# Patient Record
Sex: FEMALE | Race: WHITE | NOT HISPANIC OR LATINO
[De-identification: names, ages, dates, MRNs, and addresses within clinical notes are randomized per-mention and may not be internally consistent; named-entity substitution may affect disease eponyms.]

---

## 2017-01-17 ENCOUNTER — CHARTING TRANS (OUTPATIENT)
Dept: OTHER | Age: 37
End: 2017-01-17

## 2017-09-29 ENCOUNTER — HOSPITAL ENCOUNTER (OUTPATIENT)
Dept: GENERAL RADIOLOGY | Age: 37
Discharge: HOME | End: 2017-09-29
Attending: FAMILY MEDICINE
Payer: COMMERCIAL

## 2017-09-29 ENCOUNTER — OFFICE VISIT (OUTPATIENT)
Dept: FAMILY MEDICINE CLINIC | Facility: CLINIC | Age: 37
End: 2017-09-29

## 2017-09-29 VITALS
OXYGEN SATURATION: 97 % | TEMPERATURE: 99 F | SYSTOLIC BLOOD PRESSURE: 106 MMHG | RESPIRATION RATE: 14 BRPM | WEIGHT: 149 LBS | DIASTOLIC BLOOD PRESSURE: 66 MMHG | HEART RATE: 66 BPM | BODY MASS INDEX: 23 KG/M2

## 2017-09-29 DIAGNOSIS — M76.32 ILIOTIBIAL BAND SYNDROME OF LEFT SIDE: ICD-10-CM

## 2017-09-29 DIAGNOSIS — M25.562 CHRONIC PAIN OF LEFT KNEE: ICD-10-CM

## 2017-09-29 DIAGNOSIS — G89.29 CHRONIC PAIN OF LEFT KNEE: ICD-10-CM

## 2017-09-29 DIAGNOSIS — G89.29 CHRONIC PAIN OF LEFT KNEE: Primary | ICD-10-CM

## 2017-09-29 DIAGNOSIS — M25.562 CHRONIC PAIN OF LEFT KNEE: Primary | ICD-10-CM

## 2017-09-29 PROCEDURE — 99213 OFFICE O/P EST LOW 20 MIN: CPT | Performed by: FAMILY MEDICINE

## 2017-09-29 PROCEDURE — 73560 X-RAY EXAM OF KNEE 1 OR 2: CPT | Performed by: FAMILY MEDICINE

## 2017-10-01 NOTE — PROGRESS NOTES
Chief Complaint:   Patient presents with:  Knee Pain: on Left, 1st occurance in August, last weekend during 5K gave out, had difficulty even walking    HPI:   This is a 42year old female presenting for worsening pain to left knee along lateral aspect, wor tightness and shortness of breath. Cardiovascular: Negative for chest pain, palpitations and leg swelling. Gastrointestinal: Negative for vomiting, abdominal pain, diarrhea, blood in stool and abdominal distention.    Endocrine: Negative for cold intol normal. Pupils are equal, round, and reactive to light. Right eye exhibits no discharge. Left eye exhibits no discharge. No scleral icterus. Neck: Normal range of motion. Neck supple. No JVD present. No tracheal deviation present. No thyromegaly present. INDICATIONS:  M76.32 Iliotibial band syndrome, left leg G89.29 Other chronic pain M25.562 Pain in left knee  PATIENT STATED HISTORY: (As transcribed by Technologist)  Patient has run in 5 K runs in the past but in August developed lateral knee pain left kn

## 2017-10-27 ENCOUNTER — OFFICE VISIT (OUTPATIENT)
Dept: FAMILY MEDICINE CLINIC | Facility: CLINIC | Age: 37
End: 2017-10-27

## 2017-10-27 ENCOUNTER — LAB ENCOUNTER (OUTPATIENT)
Dept: LAB | Age: 37
End: 2017-10-27
Attending: FAMILY MEDICINE
Payer: COMMERCIAL

## 2017-10-27 VITALS
DIASTOLIC BLOOD PRESSURE: 70 MMHG | WEIGHT: 150 LBS | HEIGHT: 67 IN | RESPIRATION RATE: 16 BRPM | HEART RATE: 70 BPM | OXYGEN SATURATION: 98 % | BODY MASS INDEX: 23.54 KG/M2 | SYSTOLIC BLOOD PRESSURE: 100 MMHG

## 2017-10-27 DIAGNOSIS — Z13.0 SCREENING FOR ENDOCRINE, NUTRITIONAL, METABOLIC AND IMMUNITY DISORDER: ICD-10-CM

## 2017-10-27 DIAGNOSIS — Z13.228 SCREENING FOR ENDOCRINE, NUTRITIONAL, METABOLIC AND IMMUNITY DISORDER: ICD-10-CM

## 2017-10-27 DIAGNOSIS — Z13.21 SCREENING FOR ENDOCRINE, NUTRITIONAL, METABOLIC AND IMMUNITY DISORDER: ICD-10-CM

## 2017-10-27 DIAGNOSIS — Z01.419 WELL FEMALE EXAM WITH ROUTINE GYNECOLOGICAL EXAM: Primary | ICD-10-CM

## 2017-10-27 DIAGNOSIS — Z13.29 SCREENING FOR ENDOCRINE, NUTRITIONAL, METABOLIC AND IMMUNITY DISORDER: ICD-10-CM

## 2017-10-27 DIAGNOSIS — K58.2 IRRITABLE BOWEL SYNDROME WITH BOTH CONSTIPATION AND DIARRHEA: ICD-10-CM

## 2017-10-27 DIAGNOSIS — Z12.4 SCREENING FOR CERVICAL CANCER: ICD-10-CM

## 2017-10-27 PROCEDURE — 80050 GENERAL HEALTH PANEL: CPT | Performed by: FAMILY MEDICINE

## 2017-10-27 PROCEDURE — 36415 COLL VENOUS BLD VENIPUNCTURE: CPT | Performed by: FAMILY MEDICINE

## 2017-10-27 PROCEDURE — 87624 HPV HI-RISK TYP POOLED RSLT: CPT | Performed by: FAMILY MEDICINE

## 2017-10-27 PROCEDURE — 88175 CYTOPATH C/V AUTO FLUID REDO: CPT | Performed by: FAMILY MEDICINE

## 2017-10-27 PROCEDURE — 82306 VITAMIN D 25 HYDROXY: CPT | Performed by: FAMILY MEDICINE

## 2017-10-27 PROCEDURE — 82784 ASSAY IGA/IGD/IGG/IGM EACH: CPT | Performed by: FAMILY MEDICINE

## 2017-10-27 PROCEDURE — 83516 IMMUNOASSAY NONANTIBODY: CPT | Performed by: FAMILY MEDICINE

## 2017-10-27 PROCEDURE — 86255 FLUORESCENT ANTIBODY SCREEN: CPT | Performed by: FAMILY MEDICINE

## 2017-10-27 PROCEDURE — 99395 PREV VISIT EST AGE 18-39: CPT | Performed by: FAMILY MEDICINE

## 2017-10-27 RX ORDER — NORGESTIMATE-ETHINYL ESTRADIOL 7DAYSX3 28
1 TABLET ORAL DAILY
Qty: 84 TABLET | Refills: 3 | Status: SHIPPED | OUTPATIENT
Start: 2017-10-27 | End: 2018-11-22

## 2017-10-27 NOTE — PROGRESS NOTES
Judith Prasad is a 42year old female.     CC:  Patient presents with:  Physical: annual physical      HPI:  Annual Physical due on 09/20/1980  Annual Depression Screen due on 03/18/2017  Influenza Vaccine(1) due on 09/01/2017  Pap Smear,3 Years due anemia; denies bruising or excessive bleeding  ENDOCRINE: denies excessive thirst or urination; denies unexpected wt gain or wt loss  ALLERGY/IMM.: denies food or seasonal allergies  PSYCH: no symptoms of depression or anxiety, depression screening negativ Visit:  Signed Prescriptions Disp Refills    TRINESSA, Sd, 0.18/0.215/0.25 MG-35 MCG Oral Tab 84 tablet 3      Sig: Take 1 tablet by mouth daily.              Imaging & Consults:  None    Return in about 1 year (around 10/27/2018) for annual physical.

## 2018-06-22 NOTE — PROGRESS NOTES
[facility] Family Medicine Office Note  Chief Complaint:   No chief complaint on file.       HPI:   This is a 42year old female coming in for  HPI    Decreased libido    Has been on OCP since age 23  Never used any other forms of contracep encounter: 152 lb. Vital signs reviewed. Appears stated age, well groomed. Physical Exam   Constitutional: She is oriented to person, place, and time. She appears well-developed and well-nourished.    HENT:   Mouth/Throat: Oropharynx is clear and moist.

## 2018-06-22 NOTE — PATIENT INSTRUCTIONS
We will call with information for female pelvic floor therapy counselors or other counselors    [address], [address]  [phone]

## 2018-11-26 RX ORDER — NORGESTIMATE-ETHINYL ESTRADIOL 7DAYSX3 28
1 TABLET ORAL DAILY
Qty: 28 TABLET | Refills: 0 | Status: SHIPPED | OUTPATIENT
Start: 2018-11-26 | End: 2018-12-03 | Stop reason: RX

## 2018-12-03 ENCOUNTER — TELEPHONE (OUTPATIENT)
Dept: FAMILY MEDICINE CLINIC | Facility: CLINIC | Age: 38
End: 2018-12-03

## 2018-12-03 RX ORDER — NORGESTIMATE AND ETHINYL ESTRADIOL 7DAYSX3 28
1 KIT ORAL DAILY
Qty: 3 PACKAGE | Refills: 0 | Status: SHIPPED | OUTPATIENT
Start: 2018-12-03 | End: 2018-12-21

## 2018-12-03 NOTE — TELEPHONE ENCOUNTER
Spoke Kayley, pharmacist at [facility], who states that [facility] no longer carries [place]. They carry Tri-Sprintec. (Rx for Trinessa ordered FLOR)       OK for new Rx? Please advise.

## 2018-12-03 NOTE — TELEPHONE ENCOUNTER
Pt states all the local pharmacies are out of [facility], 28, 0.18/0.215/0.25 MG-35 MCG Oral Tab   Pt would like to know if she should change the medication? Pls call pt.

## 2018-12-05 ENCOUNTER — TELEPHONE (OUTPATIENT)
Dept: FAMILY MEDICINE CLINIC | Facility: CLINIC | Age: 38
End: 2018-12-05

## 2018-12-05 NOTE — TELEPHONE ENCOUNTER
I contacted Edith Morrell at [facility] and confirmed that they have the RX on file and will get it ready. A 90 day supply went threw for $0.  I called and left a detailed message on pts cell that they do have the RX and will get it ready now.

## 2018-12-05 NOTE — TELEPHONE ENCOUNTER
Patient checked with her pharmacy and they did not have an order for TRI-SPRINTEC 0.18/0.215/0.25 MG-35 MCG Oral Tab, please resend Hossein.

## 2018-12-21 ENCOUNTER — LAB ENCOUNTER (OUTPATIENT)
Dept: LAB | Age: 38
End: 2018-12-21
Attending: FAMILY MEDICINE
Payer: COMMERCIAL

## 2018-12-21 ENCOUNTER — OFFICE VISIT (OUTPATIENT)
Dept: FAMILY MEDICINE CLINIC | Facility: CLINIC | Age: 38
End: 2018-12-21
Payer: COMMERCIAL

## 2018-12-21 VITALS
BODY MASS INDEX: 23.54 KG/M2 | OXYGEN SATURATION: 98 % | HEIGHT: 67 IN | RESPIRATION RATE: 16 BRPM | HEART RATE: 60 BPM | WEIGHT: 150 LBS | DIASTOLIC BLOOD PRESSURE: 64 MMHG | SYSTOLIC BLOOD PRESSURE: 114 MMHG | TEMPERATURE: 99 F

## 2018-12-21 DIAGNOSIS — Z13.228 SCREENING FOR ENDOCRINE, NUTRITIONAL, METABOLIC AND IMMUNITY DISORDER: ICD-10-CM

## 2018-12-21 DIAGNOSIS — Z00.00 ROUTINE PHYSICAL EXAMINATION: Primary | ICD-10-CM

## 2018-12-21 DIAGNOSIS — F52.9 FEMALE SEXUAL DYSFUNCTION: ICD-10-CM

## 2018-12-21 DIAGNOSIS — Z13.29 SCREENING FOR ENDOCRINE, NUTRITIONAL, METABOLIC AND IMMUNITY DISORDER: ICD-10-CM

## 2018-12-21 DIAGNOSIS — Z13.0 SCREENING FOR ENDOCRINE, NUTRITIONAL, METABOLIC AND IMMUNITY DISORDER: ICD-10-CM

## 2018-12-21 DIAGNOSIS — Z13.21 SCREENING FOR ENDOCRINE, NUTRITIONAL, METABOLIC AND IMMUNITY DISORDER: ICD-10-CM

## 2018-12-21 DIAGNOSIS — Z12.39 SCREENING FOR BREAST CANCER: ICD-10-CM

## 2018-12-21 PROCEDURE — 36415 COLL VENOUS BLD VENIPUNCTURE: CPT | Performed by: FAMILY MEDICINE

## 2018-12-21 PROCEDURE — 80061 LIPID PANEL: CPT | Performed by: FAMILY MEDICINE

## 2018-12-21 PROCEDURE — 99396 PREV VISIT EST AGE 40-64: CPT | Performed by: FAMILY MEDICINE

## 2018-12-21 PROCEDURE — 80050 GENERAL HEALTH PANEL: CPT | Performed by: FAMILY MEDICINE

## 2018-12-21 RX ORDER — NORGESTIMATE AND ETHINYL ESTRADIOL 7DAYSX3 28
1 KIT ORAL DAILY
Qty: 3 PACKAGE | Refills: 3 | Status: SHIPPED | OUTPATIENT
Start: 2018-12-21 | End: 2019-12-17

## 2018-12-21 NOTE — PROGRESS NOTES
Carolina Law is a 34year old female. CC:  Patient presents with:   Well Adult: annual visit, no pap      HPI:  Mammogram due on 09/20/1978  Influenza Vaccine(1) due on 09/01/2018  Annual Depression Screen due on 10/27/2018  Annual Physical due Readings from Last 6 Encounters:  12/21/18 : 150 lb  06/22/18 : 152 lb  10/27/17 : 150 lb  09/29/17 : 149 lb  03/18/16 : 142 lb  03/11/16 : 145 lb      BP Readings from Last 3 Encounters:  12/21/18 : 114/64  06/22/18 : 100/70  10/27/17 : 100/70    REVIEW O suspicious lesions  NEURO: Oriented times three, cranial nerves are intact, motor and sensory are grossly intact  PSYCH: Normal affect and mood             ASSESSMENT/PLAN:      1.  Routine physical examination  Cont OCP     2. Screening for endocrine, nutr

## 2018-12-21 NOTE — PATIENT INSTRUCTIONS
Health Maintenance    Eat healthy well balanced diet - majority should be protein and vegetables  Get at least 150 min of exercise per week (30 min/5 days)  Wear sunscreen - SPF 15 or higher and reapply every 2 hours as needed.   Wear seat belts and drive s Acute cholecystitis with chronic cholecystitis Acute cholecystitis with chronic cholecystitis    Tachycardia  (Pt seen by cardiologist last week and was told she has no heart murmur and "everything is fine")

## 2018-12-28 ENCOUNTER — HOSPITAL ENCOUNTER (OUTPATIENT)
Dept: MAMMOGRAPHY | Age: 38
Discharge: HOME | End: 2018-12-28
Attending: FAMILY MEDICINE
Payer: COMMERCIAL

## 2018-12-28 ENCOUNTER — LAB ENCOUNTER (OUTPATIENT)
Dept: LAB | Age: 38
End: 2018-12-28
Attending: FAMILY MEDICINE
Payer: COMMERCIAL

## 2018-12-28 DIAGNOSIS — Z12.39 SCREENING FOR BREAST CANCER: ICD-10-CM

## 2018-12-28 DIAGNOSIS — F52.9 FEMALE SEXUAL DYSFUNCTION: ICD-10-CM

## 2018-12-28 LAB
FSH SERPL-ACNC: 5.2 MIU/ML
LH: 2.5 MIU/ML

## 2018-12-28 PROCEDURE — 77067 SCR MAMMO BI INCL CAD: CPT | Performed by: FAMILY MEDICINE

## 2018-12-28 PROCEDURE — 84402 ASSAY OF FREE TESTOSTERONE: CPT | Performed by: FAMILY MEDICINE

## 2018-12-28 PROCEDURE — 83002 ASSAY OF GONADOTROPIN (LH): CPT | Performed by: FAMILY MEDICINE

## 2018-12-28 PROCEDURE — 83001 ASSAY OF GONADOTROPIN (FSH): CPT | Performed by: FAMILY MEDICINE

## 2018-12-28 PROCEDURE — 84403 ASSAY OF TOTAL TESTOSTERONE: CPT | Performed by: FAMILY MEDICINE

## 2018-12-28 PROCEDURE — 36415 COLL VENOUS BLD VENIPUNCTURE: CPT | Performed by: FAMILY MEDICINE

## 2018-12-28 PROCEDURE — 82671 ASSAY OF ESTROGENS: CPT | Performed by: FAMILY MEDICINE

## 2018-12-30 LAB
ESTRADIOL BY TMS: 51.7 PG/ML
ESTROGENS TOTAL CALCULATION: 85.2 PG/ML
ESTRONE BY TMS: 33.5 PG/ML

## 2018-12-31 LAB
SEX HORMONE BINDING GLOBULIN: 149 NMOL/L
TESTOSTERONE -MS, BIOAVAILAB: 2.5 NG/DL
TESTOSTERONE, -MS/MS: 16 NG/DL
TESTOSTERONE, FREE -MS/MS: 0.9 PG/ML

## 2019-01-10 DIAGNOSIS — R79.89 LOW TESTOSTERONE: Primary | ICD-10-CM

## 2019-01-22 ENCOUNTER — TELEPHONE (OUTPATIENT)
Dept: FAMILY MEDICINE CLINIC | Facility: CLINIC | Age: 39
End: 2019-01-22

## 2019-01-22 NOTE — TELEPHONE ENCOUNTER
Received call from [facility] with the mammography department stating that they have been trying to reach the patient to schedule additional views for her alexandra since 12/28/18. States they will no longer be reaching out.      LVM for pt regarding need for addition

## 2019-02-01 ENCOUNTER — HOSPITAL ENCOUNTER (OUTPATIENT)
Dept: ULTRASOUND IMAGING | Age: 39
Discharge: HOME | End: 2019-02-01
Attending: FAMILY MEDICINE
Payer: COMMERCIAL

## 2019-02-01 ENCOUNTER — HOSPITAL ENCOUNTER (OUTPATIENT)
Dept: MAMMOGRAPHY | Age: 39
Discharge: HOME | End: 2019-02-01
Attending: FAMILY MEDICINE
Payer: COMMERCIAL

## 2019-02-01 DIAGNOSIS — R92.2 INCONCLUSIVE MAMMOGRAM: ICD-10-CM

## 2019-02-01 PROCEDURE — 77066 DX MAMMO INCL CAD BI: CPT | Performed by: FAMILY MEDICINE

## 2019-02-01 PROCEDURE — 76642 ULTRASOUND BREAST LIMITED: CPT | Performed by: FAMILY MEDICINE

## 2019-02-01 PROCEDURE — 77062 BREAST TOMOSYNTHESIS BI: CPT | Performed by: FAMILY MEDICINE

## 2019-05-19 ENCOUNTER — WALK IN (OUTPATIENT)
Dept: URGENT CARE | Age: 39
End: 2019-05-19

## 2019-05-19 VITALS
HEIGHT: 67 IN | HEART RATE: 70 BPM | TEMPERATURE: 98.4 F | BODY MASS INDEX: 23.54 KG/M2 | RESPIRATION RATE: 14 BRPM | WEIGHT: 150 LBS | DIASTOLIC BLOOD PRESSURE: 72 MMHG | SYSTOLIC BLOOD PRESSURE: 116 MMHG

## 2019-05-19 DIAGNOSIS — N30.01 ACUTE CYSTITIS WITH HEMATURIA: Primary | ICD-10-CM

## 2019-05-19 LAB
APPEARANCE, POC: ABNORMAL
BILIRUBIN, POC: NEGATIVE
COLOR, POC: YELLOW
GLUCOSE UR-MCNC: NEGATIVE MG/DL
KETONES, POC: NEGATIVE
NITRITE, POC: POSITIVE
OCCULT BLOOD, POC: ABNORMAL
PH UR: 5 [PH] (ref 5–7)
PROT UR-MCNC: ABNORMAL G/DL
SP GR UR: 1.01 (ref 1–1.03)
UROBILINOGEN UR-MCNC: 0.2 MG/DL (ref 0–1)
WBC (LEUKOCYTE) ESTERASE, POC: ABNORMAL

## 2019-05-19 PROCEDURE — 81002 URINALYSIS NONAUTO W/O SCOPE: CPT | Performed by: NURSE PRACTITIONER

## 2019-05-19 PROCEDURE — 99213 OFFICE O/P EST LOW 20 MIN: CPT | Performed by: NURSE PRACTITIONER

## 2019-05-19 RX ORDER — NITROFURANTOIN 25; 75 MG/1; MG/1
100 CAPSULE ORAL 2 TIMES DAILY
Qty: 10 CAPSULE | Refills: 0 | Status: SHIPPED | OUTPATIENT
Start: 2019-05-19 | End: 2019-05-24

## 2019-05-19 ASSESSMENT — ENCOUNTER SYMPTOMS
GASTROINTESTINAL NEGATIVE: 1
CONSTITUTIONAL NEGATIVE: 1
RESPIRATORY NEGATIVE: 1

## 2019-12-18 RX ORDER — NORGESTIMATE AND ETHINYL ESTRADIOL 7DAYSX3 28
KIT ORAL
Qty: 84 TABLET | Refills: 0 | Status: SHIPPED | OUTPATIENT
Start: 2019-12-18 | End: 2020-03-18

## 2020-03-10 RX ORDER — NORGESTIMATE AND ETHINYL ESTRADIOL 7DAYSX3 28
KIT ORAL
Qty: 84 TABLET | Refills: 0 | OUTPATIENT
Start: 2020-03-10

## 2020-03-18 ENCOUNTER — OFFICE VISIT (OUTPATIENT)
Dept: FAMILY MEDICINE CLINIC | Facility: CLINIC | Age: 40
End: 2020-03-18
Payer: COMMERCIAL

## 2020-03-18 VITALS
OXYGEN SATURATION: 99 % | WEIGHT: 151 LBS | BODY MASS INDEX: 23.7 KG/M2 | RESPIRATION RATE: 16 BRPM | HEIGHT: 67 IN | DIASTOLIC BLOOD PRESSURE: 70 MMHG | TEMPERATURE: 98 F | SYSTOLIC BLOOD PRESSURE: 110 MMHG | HEART RATE: 63 BPM

## 2020-03-18 DIAGNOSIS — Z12.31 ENCOUNTER FOR SCREENING MAMMOGRAM FOR MALIGNANT NEOPLASM OF BREAST: ICD-10-CM

## 2020-03-18 DIAGNOSIS — Z00.00 ROUTINE PHYSICAL EXAMINATION: Primary | ICD-10-CM

## 2020-03-18 DIAGNOSIS — Z23 NEED FOR TDAP VACCINATION: ICD-10-CM

## 2020-03-18 DIAGNOSIS — Z00.00 LABORATORY EXAMINATION ORDERED AS PART OF A ROUTINE GENERAL MEDICAL EXAMINATION: ICD-10-CM

## 2020-03-18 PROCEDURE — 90715 TDAP VACCINE 7 YRS/> IM: CPT | Performed by: FAMILY MEDICINE

## 2020-03-18 PROCEDURE — 99396 PREV VISIT EST AGE 40-64: CPT | Performed by: FAMILY MEDICINE

## 2020-03-18 PROCEDURE — 90471 IMMUNIZATION ADMIN: CPT | Performed by: FAMILY MEDICINE

## 2020-03-18 RX ORDER — NORGESTIMATE AND ETHINYL ESTRADIOL 7DAYSX3 28
1 KIT ORAL
Qty: 84 TABLET | Refills: 4 | Status: SHIPPED | OUTPATIENT
Start: 2020-03-18 | End: 2020-04-16

## 2020-03-18 NOTE — PROGRESS NOTES
Dianna Solis is a 37year old female. CC:  Patient presents with:   Well Adult: annual visit       HPI:  Annual Depression Screen due on 12/21/2019  Annual Physical due on 12/21/2019  Mammogram due on 02/01/2020  Influenza Vaccine(1) due on 03/1 prior to visit. No current facility-administered medications on file prior to visit. History:  Past Medical History:   Diagnosis Date   • Depression       History reviewed. No pertinent surgical history. History reviewed.  No pertinent family hist SpO2 99%   BMI 23.65 kg/m²   Body mass index is 23.65 kg/m². Patient's last menstrual period was 03/17/2020.     GENERAL: well developed, well nourished, in no apparent distress  HEENT: atraumatic, normocephalic,ears and throat are clear  EYES:PERRL, EOMI,

## 2020-03-19 LAB
ABSOLUTE BASOPHILS: 20 CELLS/UL (ref 0–200)
ABSOLUTE EOSINOPHILS: 101 CELLS/UL (ref 15–500)
ABSOLUTE LYMPHOCYTES: 1455 CELLS/UL (ref 850–3900)
ABSOLUTE MONOCYTES: 355 CELLS/UL (ref 200–950)
ABSOLUTE NEUTROPHILS: 1970 CELLS/UL (ref 1500–7800)
ALBUMIN/GLOBULIN RATIO: 1.8 (CALC) (ref 1–2.5)
ALBUMIN: 4.3 G/DL (ref 3.6–5.1)
ALKALINE PHOSPHATASE: 47 U/L (ref 31–125)
ALT: 18 U/L (ref 6–29)
AST: 26 U/L (ref 10–30)
BASOPHILS: 0.5 %
BILIRUBIN, TOTAL: 0.5 MG/DL (ref 0.2–1.2)
BUN: 16 MG/DL (ref 7–25)
CALCIUM: 9.3 MG/DL (ref 8.6–10.2)
CARBON DIOXIDE: 25 MMOL/L (ref 20–32)
CHLORIDE: 107 MMOL/L (ref 98–110)
CHOL/HDLC RATIO: 2.4 (CALC)
CHOLESTEROL, TOTAL: 195 MG/DL
CREATININE: 0.96 MG/DL (ref 0.5–1.1)
EGFR IF AFRICN AM: 85 ML/MIN/1.73M2
EGFR IF NONAFRICN AM: 73 ML/MIN/1.73M2
EOSINOPHILS: 2.6 %
GLOBULIN: 2.4 G/DL (CALC) (ref 1.9–3.7)
GLUCOSE: 77 MG/DL (ref 65–99)
HDL CHOLESTEROL: 80 MG/DL
HEMATOCRIT: 39.8 % (ref 35–45)
HEMOGLOBIN: 14.1 G/DL (ref 11.7–15.5)
LDL-CHOLESTEROL: 100 MG/DL (CALC)
LYMPHOCYTES: 37.3 %
MCH: 32.4 PG (ref 27–33)
MCHC: 35.4 G/DL (ref 32–36)
MCV: 91.5 FL (ref 80–100)
MONOCYTES: 9.1 %
MPV: 9.9 FL (ref 7.5–12.5)
NEUTROPHILS: 50.5 %
NON-HDL CHOLESTEROL: 115 MG/DL (CALC)
PLATELET COUNT: 269 THOUSAND/UL (ref 140–400)
POTASSIUM: 4.7 MMOL/L (ref 3.5–5.3)
PROTEIN, TOTAL: 6.7 G/DL (ref 6.1–8.1)
RDW: 12.5 % (ref 11–15)
RED BLOOD CELL COUNT: 4.35 MILLION/UL (ref 3.8–5.1)
SODIUM: 140 MMOL/L (ref 135–146)
TRIGLYCERIDES: 60 MG/DL
TSH W/REFLEX TO FT4: 0.79 MIU/L
WHITE BLOOD CELL COUNT: 3.9 THOUSAND/UL (ref 3.8–10.8)

## 2020-04-16 RX ORDER — NORGESTIMATE AND ETHINYL ESTRADIOL 7DAYSX3 28
1 KIT ORAL
Qty: 84 TABLET | Refills: 4 | Status: SHIPPED | OUTPATIENT
Start: 2020-04-16 | End: 2021-05-25

## 2021-02-22 ENCOUNTER — OFFICE VISIT (OUTPATIENT)
Dept: FAMILY MEDICINE CLINIC | Facility: CLINIC | Age: 41
End: 2021-02-22
Payer: COMMERCIAL

## 2021-02-22 VITALS
TEMPERATURE: 98 F | WEIGHT: 155 LBS | DIASTOLIC BLOOD PRESSURE: 70 MMHG | OXYGEN SATURATION: 100 % | HEART RATE: 86 BPM | RESPIRATION RATE: 16 BRPM | HEIGHT: 67 IN | BODY MASS INDEX: 24.33 KG/M2 | SYSTOLIC BLOOD PRESSURE: 120 MMHG

## 2021-02-22 DIAGNOSIS — R10.9 ABDOMINAL CRAMPING: ICD-10-CM

## 2021-02-22 DIAGNOSIS — L60.9 NAIL DISORDER: ICD-10-CM

## 2021-02-22 DIAGNOSIS — Z00.00 LABORATORY EXAMINATION ORDERED AS PART OF A ROUTINE GENERAL MEDICAL EXAMINATION: ICD-10-CM

## 2021-02-22 DIAGNOSIS — M25.50 POLYARTHRALGIA: ICD-10-CM

## 2021-02-22 DIAGNOSIS — K92.1 HEMATOCHEZIA: Primary | ICD-10-CM

## 2021-02-22 PROCEDURE — 3078F DIAST BP <80 MM HG: CPT | Performed by: FAMILY MEDICINE

## 2021-02-22 PROCEDURE — 3008F BODY MASS INDEX DOCD: CPT | Performed by: FAMILY MEDICINE

## 2021-02-22 PROCEDURE — 99214 OFFICE O/P EST MOD 30 MIN: CPT | Performed by: FAMILY MEDICINE

## 2021-02-22 PROCEDURE — 3074F SYST BP LT 130 MM HG: CPT | Performed by: FAMILY MEDICINE

## 2021-02-22 NOTE — PROGRESS NOTES
[facility] Family Medicine Office Note  Chief Complaint:   Patient presents with:  Abdominal Pain: f/u  Bloating: f/u  Arm Pain: x1 week, right arm, muscle ache       HPI:   This is a 41year old female coming in for  HPI  GI symptoms   Started a pain and visual disturbance. Respiratory: Negative for cough and shortness of breath. Cardiovascular: Negative for chest pain and palpitations. Gastrointestinal: Positive for abdominal pain and blood in stool. Negative for nausea and vomiting.    Gen ENAS        Meds & Refills for this Visit:  Requested Prescriptions     Signed Prescriptions Disp Refills   • Hydrocort-Pramoxine, Perianal, 1-1 % External Foam 10 g 1     Sig: Place 1 applicator rectally 2 (two) times daily.        Follow up with GI if jarett

## 2021-02-26 ENCOUNTER — HOSPITAL ENCOUNTER (OUTPATIENT)
Dept: CT IMAGING | Age: 41
Discharge: HOME | End: 2021-02-26
Attending: FAMILY MEDICINE
Payer: COMMERCIAL

## 2021-02-26 DIAGNOSIS — R10.9 ABDOMINAL CRAMPING: ICD-10-CM

## 2021-02-26 DIAGNOSIS — K92.1 HEMATOCHEZIA: ICD-10-CM

## 2021-02-26 LAB — CREAT BLD-MCNC: 1 MG/DL

## 2021-02-26 PROCEDURE — 82565 ASSAY OF CREATININE: CPT

## 2021-02-26 PROCEDURE — 74177 CT ABD & PELVIS W/CONTRAST: CPT | Performed by: FAMILY MEDICINE

## 2021-03-01 LAB
% SATURATION: 3 % (CALC) (ref 16–45)
ABSOLUTE BASOPHILS: 31 CELLS/UL (ref 0–200)
ABSOLUTE EOSINOPHILS: 158 CELLS/UL (ref 15–500)
ABSOLUTE LYMPHOCYTES: 1352 CELLS/UL (ref 850–3900)
ABSOLUTE MONOCYTES: 525 CELLS/UL (ref 200–950)
ABSOLUTE NEUTROPHILS: 3035 CELLS/UL (ref 1500–7800)
ALBUMIN/GLOBULIN RATIO: 1.6 (CALC) (ref 1–2.5)
ALBUMIN: 4.2 G/DL (ref 3.6–5.1)
ALKALINE PHOSPHATASE: 45 U/L (ref 31–125)
ALT: 20 U/L (ref 6–29)
ANA SCREEN, IFA: NEGATIVE
AST: 23 U/L (ref 10–30)
BASOPHILS: 0.6 %
BILIRUBIN, TOTAL: 0.3 MG/DL (ref 0.2–1.2)
BUN: 20 MG/DL (ref 7–25)
CALCIUM: 9.3 MG/DL (ref 8.6–10.2)
CARBON DIOXIDE: 27 MMOL/L (ref 20–32)
CHLORIDE: 105 MMOL/L (ref 98–110)
CHOL/HDLC RATIO: 2 (CALC)
CHOLESTEROL, TOTAL: 175 MG/DL
CREATININE: 1.1 MG/DL (ref 0.5–1.1)
EGFR IF AFRICN AM: 72 ML/MIN/1.73M2
EGFR IF NONAFRICN AM: 62 ML/MIN/1.73M2
EOSINOPHILS: 3.1 %
FERRITIN: 2 NG/ML (ref 16–232)
FOLATE, SERUM: 23.3 NG/ML
GLOBULIN: 2.6 G/DL (CALC) (ref 1.9–3.7)
GLUCOSE: 79 MG/DL (ref 65–99)
HDL CHOLESTEROL: 86 MG/DL
HEMATOCRIT: 30.8 % (ref 35–45)
HEMOGLOBIN: 9.3 G/DL (ref 11.7–15.5)
IRON BINDING CAPACITY: 544 MCG/DL (CALC) (ref 250–450)
IRON, TOTAL: 19 MCG/DL (ref 40–190)
LDL-CHOLESTEROL: 75 MG/DL (CALC)
LYMPHOCYTES: 26.5 %
MCH: 24.8 PG (ref 27–33)
MCHC: 30.2 G/DL (ref 32–36)
MCV: 82.1 FL (ref 80–100)
MONOCYTES: 10.3 %
MPV: 9.9 FL (ref 7.5–12.5)
NEUTROPHILS: 59.5 %
NON-HDL CHOLESTEROL: 89 MG/DL (CALC)
PLATELET COUNT: 381 THOUSAND/UL (ref 140–400)
POTASSIUM: 4.1 MMOL/L (ref 3.5–5.3)
PROTEIN, TOTAL: 6.8 G/DL (ref 6.1–8.1)
RDW: 13.5 % (ref 11–15)
RED BLOOD CELL COUNT: 3.75 MILLION/UL (ref 3.8–5.1)
SODIUM: 138 MMOL/L (ref 135–146)
TRIGLYCERIDES: 62 MG/DL
TSH W/REFLEX TO FT4: 2.13 MIU/L
VITAMIN B12: 359 PG/ML (ref 200–1100)
WHITE BLOOD CELL COUNT: 5.1 THOUSAND/UL (ref 3.8–10.8)

## 2021-03-02 ENCOUNTER — TELEPHONE (OUTPATIENT)
Dept: FAMILY MEDICINE CLINIC | Facility: CLINIC | Age: 41
End: 2021-03-02

## 2021-03-02 DIAGNOSIS — K92.1 HEMATOCHEZIA: Primary | ICD-10-CM

## 2021-03-02 DIAGNOSIS — D50.9 IRON DEFICIENCY ANEMIA, UNSPECIFIED IRON DEFICIENCY ANEMIA TYPE: ICD-10-CM

## 2021-03-02 NOTE — TELEPHONE ENCOUNTER
Clarified with PCP medication directions for Slow FE. Patient to take one tablet daily for one week then two tablets daily after week one.

## 2021-03-02 NOTE — TELEPHONE ENCOUNTER
Spoke with the patient via phone. Notified the patient of the below lab results and orders. Patient verbalized understanding. Provided the patient with the contact information for Dr. Yusef Samayoa.  Patient voiced back to this nurse the medication directions and

## 2021-03-21 ENCOUNTER — LAB ENCOUNTER (OUTPATIENT)
Dept: LAB | Facility: HOSPITAL | Age: 41
End: 2021-03-21
Attending: INTERNAL MEDICINE
Payer: COMMERCIAL

## 2021-03-21 DIAGNOSIS — Z01.818 PRE-OP TESTING: ICD-10-CM

## 2021-03-22 ENCOUNTER — OFFICE VISIT (OUTPATIENT)
Dept: FAMILY MEDICINE CLINIC | Facility: CLINIC | Age: 41
End: 2021-03-22
Payer: COMMERCIAL

## 2021-03-22 VITALS
SYSTOLIC BLOOD PRESSURE: 110 MMHG | WEIGHT: 159 LBS | TEMPERATURE: 97 F | RESPIRATION RATE: 16 BRPM | OXYGEN SATURATION: 100 % | BODY MASS INDEX: 24.96 KG/M2 | HEIGHT: 67 IN | DIASTOLIC BLOOD PRESSURE: 60 MMHG | HEART RATE: 70 BPM

## 2021-03-22 DIAGNOSIS — K58.0 IRRITABLE BOWEL SYNDROME WITH DIARRHEA: ICD-10-CM

## 2021-03-22 DIAGNOSIS — K92.1 HEMATOCHEZIA: ICD-10-CM

## 2021-03-22 DIAGNOSIS — Z12.4 SCREENING FOR CERVICAL CANCER: ICD-10-CM

## 2021-03-22 DIAGNOSIS — Z12.31 ENCOUNTER FOR SCREENING MAMMOGRAM FOR MALIGNANT NEOPLASM OF BREAST: ICD-10-CM

## 2021-03-22 DIAGNOSIS — Z00.00 ROUTINE PHYSICAL EXAMINATION: Primary | ICD-10-CM

## 2021-03-22 DIAGNOSIS — D50.0 IRON DEFICIENCY ANEMIA DUE TO CHRONIC BLOOD LOSS: ICD-10-CM

## 2021-03-22 LAB — SARS-COV-2 RNA RESP QL NAA+PROBE: NOT DETECTED

## 2021-03-22 PROCEDURE — 3078F DIAST BP <80 MM HG: CPT | Performed by: FAMILY MEDICINE

## 2021-03-22 PROCEDURE — 87624 HPV HI-RISK TYP POOLED RSLT: CPT | Performed by: FAMILY MEDICINE

## 2021-03-22 PROCEDURE — 88175 CYTOPATH C/V AUTO FLUID REDO: CPT | Performed by: FAMILY MEDICINE

## 2021-03-22 PROCEDURE — 3074F SYST BP LT 130 MM HG: CPT | Performed by: FAMILY MEDICINE

## 2021-03-22 PROCEDURE — 99396 PREV VISIT EST AGE 40-64: CPT | Performed by: FAMILY MEDICINE

## 2021-03-22 PROCEDURE — 3008F BODY MASS INDEX DOCD: CPT | Performed by: FAMILY MEDICINE

## 2021-03-22 NOTE — PROGRESS NOTES
Rosalie Keller is a 41year old female. CC:  Patient presents with:   Well Adult: and pap smear       HPI:  Mammogram due on 02/01/2020  Pap Smear,3 Years due on 10/27/2020  Annual Physical due on 03/18/2021  Influenza Vaccine(1) due on 02/22/2022 142 (45 Fe) MG Oral Tab CR, Take by mouth 2 (two) times a day., Disp: , Rfl:   Ascorbic Acid (VITAMIN C-SHERINE HIPS) 500 MG Oral Tab, Take 500 mg by mouth daily. , Disp: , Rfl:   Norgestim-Eth Estrad Triphasic (TRI-SPRINTEC) 0.18/0.215/0.25 MG-35 MCG Oral Tab Encounters:  03/22/21 : 110/60  03/11/21 : 116/73  02/22/21 : 120/70    REVIEW OF SYSTEMS:     GENERAL HEALTH: feels well, no fevers/chills, some fatigue (was improving but slightly worsened again)  SKIN: denies any unusual skin lesions or rashes  EYES: no cyanosis, clubbing or edema  SKIN: no rashes,no suspicious lesions  NEURO: Oriented times three, cranial nerves are intact, motor and sensory are grossly intact  PSYCH: Normal affect and mood             ASSESSMENT/PLAN:      1.  Routine physical examinatio

## 2021-03-24 PROBLEM — K92.1 HEMATOCHEZIA: Status: ACTIVE | Noted: 2021-03-24

## 2021-03-24 PROBLEM — D64.9 ANEMIA: Status: ACTIVE | Noted: 2021-03-24

## 2021-03-25 LAB — HPV I/H RISK 1 DNA SPEC QL NAA+PROBE: NEGATIVE

## 2021-04-13 ENCOUNTER — ORDER TRANSCRIPTION (OUTPATIENT)
Dept: ADMINISTRATIVE | Facility: HOSPITAL | Age: 41
End: 2021-04-13

## 2021-04-13 ENCOUNTER — LAB ENCOUNTER (OUTPATIENT)
Dept: LAB | Age: 41
End: 2021-04-13
Attending: INTERNAL MEDICINE
Payer: COMMERCIAL

## 2021-04-13 DIAGNOSIS — Z20.822 ENCOUNTER FOR PREPROCEDURE SCREENING LABORATORY TESTING FOR COVID-19: Primary | ICD-10-CM

## 2021-04-13 DIAGNOSIS — Z01.812 ENCOUNTER FOR PREPROCEDURE SCREENING LABORATORY TESTING FOR COVID-19: Primary | ICD-10-CM

## 2021-04-13 DIAGNOSIS — Z20.822 ENCOUNTER FOR PREPROCEDURE SCREENING LABORATORY TESTING FOR COVID-19: ICD-10-CM

## 2021-04-13 DIAGNOSIS — Z01.812 ENCOUNTER FOR PREPROCEDURE SCREENING LABORATORY TESTING FOR COVID-19: ICD-10-CM

## 2021-04-15 PROBLEM — K64.8 HEMORRHOIDS WITH COMPLICATION: Status: ACTIVE | Noted: 2021-04-15

## 2021-04-23 ENCOUNTER — OFFICE VISIT (OUTPATIENT)
Dept: SURGERY | Facility: CLINIC | Age: 41
End: 2021-04-23
Payer: COMMERCIAL

## 2021-04-23 VITALS — HEART RATE: 63 BPM | DIASTOLIC BLOOD PRESSURE: 70 MMHG | SYSTOLIC BLOOD PRESSURE: 114 MMHG | TEMPERATURE: 97 F

## 2021-04-23 DIAGNOSIS — K64.8 HEMORRHOIDS WITH COMPLICATION: Primary | ICD-10-CM

## 2021-04-23 PROCEDURE — 46600 DIAGNOSTIC ANOSCOPY SPX: CPT | Performed by: SURGERY

## 2021-04-23 PROCEDURE — 99203 OFFICE O/P NEW LOW 30 MIN: CPT | Performed by: SURGERY

## 2021-04-23 PROCEDURE — 3074F SYST BP LT 130 MM HG: CPT | Performed by: SURGERY

## 2021-04-23 PROCEDURE — 3078F DIAST BP <80 MM HG: CPT | Performed by: SURGERY

## 2021-04-23 NOTE — H&P
New Patient Visit Note       Active Problems      1. Hemorrhoids with complication        Chief Complaint   Patient presents with:  Hemorrhoids: [facility] PT ref by sub GI for hemorrhoids -- Denies pain or discomfort.  States bleeding with every BM, denies changes eczema by left ear     Past Surgical History:   Procedure Laterality Date   • COLONOSCOPY  May 2016       The family history and social history have been reviewed by me today.     Family History   Problem Relation Age of Onset   • Diabetes Maternal Grandmot Hematological: Negative for adenopathy. Does not bruise/bleed easily. Psychiatric/Behavioral: Negative for behavioral problems and sleep disturbance.        Physical Findings   /70   Pulse 63   Temp 96.6 °F (35.9 °C)   LMP 03/16/2021   Physical Ex benefits, and alternatives to rubber banding internal hemorrhoids were discussed with her in detail. Risks included, but are not limited to, recurrence of the hemorrhoidal disease and bleeding.  The patient is agreeable to proceed with the rubber banding tr

## 2021-04-26 ENCOUNTER — OFFICE VISIT (OUTPATIENT)
Dept: SURGERY | Facility: CLINIC | Age: 41
End: 2021-04-26
Payer: COMMERCIAL

## 2021-04-26 VITALS
WEIGHT: 156.19 LBS | HEIGHT: 67 IN | BODY MASS INDEX: 24.52 KG/M2 | SYSTOLIC BLOOD PRESSURE: 116 MMHG | TEMPERATURE: 98 F | DIASTOLIC BLOOD PRESSURE: 78 MMHG | HEART RATE: 64 BPM

## 2021-04-26 DIAGNOSIS — K64.8 HEMORRHOIDS WITH COMPLICATION: Primary | ICD-10-CM

## 2021-04-26 PROCEDURE — 46221 LIGATION OF HEMORRHOID(S): CPT | Performed by: SURGERY

## 2021-04-26 PROCEDURE — 3008F BODY MASS INDEX DOCD: CPT | Performed by: SURGERY

## 2021-04-26 PROCEDURE — 3074F SYST BP LT 130 MM HG: CPT | Performed by: SURGERY

## 2021-04-26 PROCEDURE — 3078F DIAST BP <80 MM HG: CPT | Performed by: SURGERY

## 2021-04-26 NOTE — PROGRESS NOTES
Follow Up Visit Note       Active Problems      1. Hemorrhoids with complication          Chief Complaint   Patient presents with:  Anal Problem: 1st banding - c/o No pain. Pt states drops of blood into toilet and soaking toilet paper.   No diarrhea or con Smokeless tobacco: Never Used    Substance and Sexual Activity      Alcohol use: Yes      Drug use: No    Other Topics      Concerns:       Current Outpatient Medications:   •  Ferrous Sulfate ER (IRON SLOW RELEASE) 142 (45 Fe) MG Oral Tab CR, Take by mout icterus. Neck:      Trachea: No tracheal deviation. Genitourinary:     Rectum: Internal hemorrhoid present. No mass, tenderness or anal fissure. Normal anal tone.       Comments: No Rectocele  No Rectovaginal Fistula  No Episiotomy  Anal Sphincter Intact

## 2021-04-26 NOTE — PROCEDURES
Pre op diagnosis: Symptomatic Internal Hemorrhoids    Post op diagnosis:  Symptomatic Internal Hemorrhoids    Procedure: Anoscopy with O-ring Rubber Band Ligation of Internal Hemorrhoids, 2:00    Surgeon:  Dr. Jacqueline Gracia    Operative findings:    The

## 2021-05-10 ENCOUNTER — OFFICE VISIT (OUTPATIENT)
Dept: SURGERY | Facility: CLINIC | Age: 41
End: 2021-05-10
Payer: COMMERCIAL

## 2021-05-10 VITALS — TEMPERATURE: 97 F | HEART RATE: 47 BPM | DIASTOLIC BLOOD PRESSURE: 75 MMHG | SYSTOLIC BLOOD PRESSURE: 118 MMHG

## 2021-05-10 DIAGNOSIS — K64.8 HEMORRHOIDS WITH COMPLICATION: Primary | ICD-10-CM

## 2021-05-10 PROCEDURE — 3074F SYST BP LT 130 MM HG: CPT | Performed by: SURGERY

## 2021-05-10 PROCEDURE — 3078F DIAST BP <80 MM HG: CPT | Performed by: SURGERY

## 2021-05-10 PROCEDURE — 46221 LIGATION OF HEMORRHOID(S): CPT | Performed by: SURGERY

## 2021-05-10 NOTE — PROGRESS NOTES
Follow Up Visit Note       Active Problems      1. Hemorrhoids with complication          Chief Complaint   Patient presents with:  Hemorrhoid Bandinnd banding -- States 1st one went well. Denies new or worsening pain. Denies new or worsening bleeding. Alcohol use: Yes      Drug use: No    Other Topics      Concerns:       Current Outpatient Medications:   •  Ferrous Sulfate ER (IRON SLOW RELEASE) 142 (45 Fe) MG Oral Tab CR, Take by mouth 2 (two) times a day., Disp: , Rfl:   •  Ascorbic Acid (VITAMIN C-R Rectovaginal Fistula  No Episiotomy  Anal Sphincter Intact  No Pruritis Ani  No Lichenification  No Abscess  No Fistula in ano  No Anterior Fissure  No Posterior Fissure    See Procedures:  Rubber Banding    Skin:     General: Skin is warm and dry.    Neuro

## 2021-05-11 NOTE — PROCEDURES
Pre op diagnosis: Symptomatic Internal Hemorrhoids    Post op diagnosis:  Symptomatic Internal Hemorrhoids    Procedure: Anoscopy with O-ring Rubber Band Ligation of Internal Hemorrhoids, 9:00    Surgeon:  Dr. Sandra Michaels    Operative findings:    The

## 2021-05-17 ENCOUNTER — WALK IN (OUTPATIENT)
Dept: URGENT CARE | Age: 41
End: 2021-05-17

## 2021-05-17 VITALS
HEIGHT: 67 IN | TEMPERATURE: 98 F | WEIGHT: 150 LBS | SYSTOLIC BLOOD PRESSURE: 110 MMHG | HEART RATE: 84 BPM | RESPIRATION RATE: 18 BRPM | OXYGEN SATURATION: 98 % | BODY MASS INDEX: 23.54 KG/M2 | DIASTOLIC BLOOD PRESSURE: 72 MMHG

## 2021-05-17 DIAGNOSIS — N30.01 ACUTE CYSTITIS WITH HEMATURIA: Primary | ICD-10-CM

## 2021-05-17 LAB
APPEARANCE, POC: ABNORMAL
BILIRUBIN, POC: NEGATIVE
COLOR, POC: YELLOW
GLUCOSE UR-MCNC: NEGATIVE MG/DL
KETONES, POC: NEGATIVE MG/DL
NITRITE, POC: NEGATIVE
OCCULT BLOOD, POC: ABNORMAL
PH UR: 6 [PH] (ref 5–7)
PROT UR-MCNC: NEGATIVE MG/DL
SP GR UR: 1.01 (ref 1–1.03)
UROBILINOGEN UR-MCNC: 0.2 MG/DL (ref 0–1)
WBC (LEUKOCYTE) ESTERASE, POC: ABNORMAL

## 2021-05-17 PROCEDURE — 87186 SC STD MICRODIL/AGAR DIL: CPT | Performed by: NURSE PRACTITIONER

## 2021-05-17 PROCEDURE — 87086 URINE CULTURE/COLONY COUNT: CPT | Performed by: NURSE PRACTITIONER

## 2021-05-17 PROCEDURE — 99213 OFFICE O/P EST LOW 20 MIN: CPT | Performed by: NURSE PRACTITIONER

## 2021-05-17 PROCEDURE — 81002 URINALYSIS NONAUTO W/O SCOPE: CPT | Performed by: NURSE PRACTITIONER

## 2021-05-17 PROCEDURE — 87077 CULTURE AEROBIC IDENTIFY: CPT | Performed by: NURSE PRACTITIONER

## 2021-05-17 RX ORDER — ASCORBIC ACID 500 MG
500 TABLET ORAL
COMMUNITY

## 2021-05-17 RX ORDER — NITROFURANTOIN 25; 75 MG/1; MG/1
100 CAPSULE ORAL 2 TIMES DAILY
Qty: 10 CAPSULE | Refills: 0 | Status: SHIPPED | OUTPATIENT
Start: 2021-05-17 | End: 2021-05-22

## 2021-05-17 RX ORDER — NORGESTIMATE AND ETHINYL ESTRADIOL 7DAYSX3 28
1 KIT ORAL
COMMUNITY
Start: 2020-04-16

## 2021-05-17 ASSESSMENT — ENCOUNTER SYMPTOMS
LIGHT-HEADEDNESS: 0
WHEEZING: 0
CHEST TIGHTNESS: 0
SORE THROAT: 0
RHINORRHEA: 0
NAUSEA: 0
APPETITE CHANGE: 0
NERVOUS/ANXIOUS: 0
FATIGUE: 0
SLEEP DISTURBANCE: 0
CHILLS: 0
ACTIVITY CHANGE: 0
SHORTNESS OF BREATH: 0
BACK PAIN: 0
VOMITING: 0
CONFUSION: 0
FEVER: 0
DIZZINESS: 0
HEADACHES: 0
ABDOMINAL DISTENTION: 0
ABDOMINAL PAIN: 0
COUGH: 0

## 2021-05-19 LAB — BACTERIA UR CULT: ABNORMAL

## 2021-05-20 ENCOUNTER — TELEPHONE (OUTPATIENT)
Dept: URGENT CARE | Age: 41
End: 2021-05-20

## 2021-05-24 ENCOUNTER — OFFICE VISIT (OUTPATIENT)
Dept: SURGERY | Facility: CLINIC | Age: 41
End: 2021-05-24
Payer: COMMERCIAL

## 2021-05-24 VITALS — TEMPERATURE: 97 F | HEART RATE: 49 BPM | DIASTOLIC BLOOD PRESSURE: 71 MMHG | SYSTOLIC BLOOD PRESSURE: 117 MMHG

## 2021-05-24 DIAGNOSIS — K64.8 HEMORRHOIDS WITH COMPLICATION: Primary | ICD-10-CM

## 2021-05-24 PROCEDURE — 3074F SYST BP LT 130 MM HG: CPT | Performed by: SURGERY

## 2021-05-24 PROCEDURE — 3078F DIAST BP <80 MM HG: CPT | Performed by: SURGERY

## 2021-05-24 PROCEDURE — 46221 LIGATION OF HEMORRHOID(S): CPT | Performed by: SURGERY

## 2021-05-24 NOTE — PROGRESS NOTES
Follow Up Visit Note       Active Problems      1. Hemorrhoids with complication          Chief Complaint   Patient presents with:  Hemorrhoid Banding: 3rd banding -- States first two went well.  States after 2nd banding had some bleeding that stayed for a Not on file    Tobacco Use      Smoking status: Never Smoker      Smokeless tobacco: Never Used    Substance and Sexual Activity      Alcohol use: Yes      Drug use: No    Other Topics      Concerns:       Current Outpatient Medications:   •  Ferrous Sulfa Rectum: Internal hemorrhoid present. No mass, tenderness or anal fissure. Normal anal tone.       Comments: No Rectocele  No Rectovaginal Fistula  No Episiotomy  Anal Sphincter Intact  No Pruritis Ani  No Lichenification  No Abscess  No Fistula in ano  No A

## 2021-05-25 RX ORDER — NORGESTIMATE AND ETHINYL ESTRADIOL 7DAYSX3 28
KIT ORAL
Qty: 84 TABLET | Refills: 3 | Status: SHIPPED | OUTPATIENT
Start: 2021-05-25 | End: 2022-04-04

## 2021-05-25 NOTE — PROCEDURES
Pre op diagnosis: Symptomatic Internal Hemorrhoids    Post op diagnosis:  Symptomatic Internal Hemorrhoids    Procedure: Anoscopy with O-ring Rubber Band Ligation of Internal Hemorrhoids, 2:00    Surgeon:  Dr. Kyle Walters    Operative findings:    The

## 2021-05-29 ENCOUNTER — OFFICE VISIT (OUTPATIENT)
Dept: FAMILY MEDICINE CLINIC | Facility: CLINIC | Age: 41
End: 2021-05-29
Payer: COMMERCIAL

## 2021-05-29 VITALS
WEIGHT: 155 LBS | TEMPERATURE: 98 F | BODY MASS INDEX: 24 KG/M2 | DIASTOLIC BLOOD PRESSURE: 71 MMHG | RESPIRATION RATE: 18 BRPM | OXYGEN SATURATION: 100 % | HEART RATE: 60 BPM | SYSTOLIC BLOOD PRESSURE: 124 MMHG

## 2021-05-29 DIAGNOSIS — L50.9 URTICARIA: Primary | ICD-10-CM

## 2021-05-29 PROCEDURE — 3074F SYST BP LT 130 MM HG: CPT | Performed by: NURSE PRACTITIONER

## 2021-05-29 PROCEDURE — 99213 OFFICE O/P EST LOW 20 MIN: CPT | Performed by: NURSE PRACTITIONER

## 2021-05-29 PROCEDURE — 3078F DIAST BP <80 MM HG: CPT | Performed by: NURSE PRACTITIONER

## 2021-05-29 NOTE — PATIENT INSTRUCTIONS
Hives (Adult)  Hives are pink or red bumps on the skin. These bumps are also known as wheals. The bumps can itch, burn, or sting. Hives can occur anywhere on the body. They vary in size and shape and can form in clusters.  Individual hives can appear and need to be taken often and may make you sleepy. They are best used at bedtime. Don’t use diphenhydramine if you have glaucoma or have trouble urinating because of an enlarged prostate.  Newer antihistamines, such as loratadine, cetirizine, levocetirizine, a

## 2021-05-29 NOTE — PROGRESS NOTES
CHIEF COMPLAINT:   Patient presents with:  Urticaria       HPI:    Serafin Larson is a 41year old female who presents for evaluation of hives. Per patient rash started at around 8:30 last evening.  Rash has been somewhat improved, less elevated s by left ear      Past Surgical History:   Procedure Laterality Date   • COLONOSCOPY  May 2016      Family History   Problem Relation Age of Onset   • Diabetes Maternal Grandmother    • Stroke Maternal Grandfather       Social History    Tobacco Use      Sm systemic steroid at this time  Will try oral antihistamine  Topical triamcinolone and cool compresses    Will start steroid if sx worsen or do not respond to [address] for this Visit:  Requested Prescriptions     Signed Prescriptions Disp Refil can make the itching worse. · Apply an ice pack or cool pack wrapped in a thin towel to your skin. This will help reduce redness and itching. But if your hives were caused by exposure to cold, then do not apply more cold to them.   · You may use over-the c breathing or swallowing  · Dizziness, weakness, or fainting  Carley last reviewed this educational content on 6/1/2019  © 5195-9346 [employer]. All rights reserved.  This information is not intended as a substitute for professional medical

## 2021-05-30 ENCOUNTER — OFFICE VISIT (OUTPATIENT)
Dept: FAMILY MEDICINE CLINIC | Facility: CLINIC | Age: 41
End: 2021-05-30
Payer: COMMERCIAL

## 2021-05-30 VITALS
DIASTOLIC BLOOD PRESSURE: 82 MMHG | BODY MASS INDEX: 24 KG/M2 | RESPIRATION RATE: 18 BRPM | OXYGEN SATURATION: 100 % | TEMPERATURE: 98 F | WEIGHT: 155 LBS | SYSTOLIC BLOOD PRESSURE: 131 MMHG | HEART RATE: 85 BPM

## 2021-05-30 DIAGNOSIS — R31.21 ASYMPTOMATIC MICROSCOPIC HEMATURIA: ICD-10-CM

## 2021-05-30 DIAGNOSIS — L50.9 URTICARIA: Primary | ICD-10-CM

## 2021-05-30 PROCEDURE — 99214 OFFICE O/P EST MOD 30 MIN: CPT | Performed by: NURSE PRACTITIONER

## 2021-05-30 PROCEDURE — 87086 URINE CULTURE/COLONY COUNT: CPT | Performed by: NURSE PRACTITIONER

## 2021-05-30 PROCEDURE — 3079F DIAST BP 80-89 MM HG: CPT | Performed by: NURSE PRACTITIONER

## 2021-05-30 PROCEDURE — U0002 COVID-19 LAB TEST NON-CDC: HCPCS | Performed by: NURSE PRACTITIONER

## 2021-05-30 PROCEDURE — 3075F SYST BP GE 130 - 139MM HG: CPT | Performed by: NURSE PRACTITIONER

## 2021-05-30 PROCEDURE — 81003 URINALYSIS AUTO W/O SCOPE: CPT | Performed by: NURSE PRACTITIONER

## 2021-05-30 PROCEDURE — 96372 THER/PROPH/DIAG INJ SC/IM: CPT | Performed by: NURSE PRACTITIONER

## 2021-05-30 RX ORDER — METHYLPREDNISOLONE SODIUM SUCCINATE 125 MG/2ML
125 INJECTION, POWDER, LYOPHILIZED, FOR SOLUTION INTRAMUSCULAR; INTRAVENOUS ONCE
Status: COMPLETED | OUTPATIENT
Start: 2021-05-30 | End: 2021-05-30

## 2021-05-30 RX ORDER — HYDROXYZINE HYDROCHLORIDE 25 MG/1
25 TABLET, FILM COATED ORAL 3 TIMES DAILY PRN
Qty: 15 TABLET | Refills: 0 | Status: SHIPPED | OUTPATIENT
Start: 2021-05-30 | End: 2021-08-24

## 2021-05-30 RX ORDER — FAMOTIDINE 40 MG/1
40 TABLET, FILM COATED ORAL DAILY
Qty: 14 TABLET | Refills: 0 | Status: SHIPPED | OUTPATIENT
Start: 2021-05-30 | End: 2021-06-13

## 2021-05-30 RX ORDER — METHYLPREDNISOLONE 4 MG/1
TABLET ORAL
Qty: 1 EACH | Refills: 0 | Status: SHIPPED | OUTPATIENT
Start: 2021-05-30 | End: 2021-08-24

## 2021-05-30 RX ADMIN — METHYLPREDNISOLONE SODIUM SUCCINATE 125 MG: 125 INJECTION, POWDER, LYOPHILIZED, FOR SOLUTION INTRAMUSCULAR; INTRAVENOUS at 08:31:00

## 2021-05-30 NOTE — PROGRESS NOTES
CHIEF COMPLAINT:   Patient presents with:  Hives: worsening       HPI:    Elena Ashraf is a 41year old female who presents for follow up on hives.  Seen yesterday here at [facility], at time of evaluation, pt decided to try oral antihistamines and topical Excessive bleeding 08-Jan-2021   • Fatigue Jan 2021   • Flatulence/gas pain/belching Jan 2021   • Frequent UTI Have a history but none for years   • Headache disorder Jan 2021   • Hemorrhoids Years ago   • Leg swelling Occasionally for years   • Rash 2 yea CARDIO: RRR without murmur. LYMPH: No lymphadenopathy. ASSESSMENT AND PLAN:   Jing Felix is a 41year old female who presents for evaluation of a rash.  Findings are consistent with:    ASSESSMENT:  Urticaria  (primary encounter diagnosis)  As · Antibiotics, especially penicillin and sulfa-based medicines   · Anticonvulsant or antiseizure medicines   · Chemotherapy medicines   Other causes of hives include:  · Infection or virus  · Heat  · Cold air or cold water  · Exercise  · Scratching or ru breathing, talking, or swallowing  · Cool, moist, or pale (blue in color) skin  · Fast and weak heartbeat  · Wheezing or short of breath  · Feeling lightheaded or confused  · Diarrhea  · Belly (abdominal) pain, cramps, or bloating  · Severe nausea or vomit

## 2021-05-30 NOTE — PATIENT INSTRUCTIONS
Understanding Hives (Urticaria)  Hives (urticaria) are red, itchy, and swollen areas (welts) on the skin. Hives are most often caused by an allergic reaction from eating a food or taking a medicine. But sometimes the cause may not be known.  A single hive it, and call 911or go to the emergency room.    When to call your healthcare provider   Call your healthcare provider if:   · Your hives feel uncomfortable  · You have never had hives before  · Your symptoms don't go away or come back  · Your symptoms get w

## 2021-06-04 ENCOUNTER — OFFICE VISIT (OUTPATIENT)
Dept: FAMILY MEDICINE CLINIC | Facility: CLINIC | Age: 41
End: 2021-06-04
Payer: COMMERCIAL

## 2021-06-04 VITALS
WEIGHT: 148 LBS | DIASTOLIC BLOOD PRESSURE: 80 MMHG | TEMPERATURE: 98 F | RESPIRATION RATE: 21 BRPM | BODY MASS INDEX: 23.23 KG/M2 | SYSTOLIC BLOOD PRESSURE: 120 MMHG | OXYGEN SATURATION: 99 % | HEART RATE: 60 BPM | HEIGHT: 67 IN

## 2021-06-04 DIAGNOSIS — D50.0 IRON DEFICIENCY ANEMIA DUE TO CHRONIC BLOOD LOSS: ICD-10-CM

## 2021-06-04 DIAGNOSIS — L50.9 URTICARIAL RASH: Primary | ICD-10-CM

## 2021-06-04 PROCEDURE — 3008F BODY MASS INDEX DOCD: CPT | Performed by: FAMILY MEDICINE

## 2021-06-04 PROCEDURE — 3074F SYST BP LT 130 MM HG: CPT | Performed by: FAMILY MEDICINE

## 2021-06-04 PROCEDURE — 3079F DIAST BP 80-89 MM HG: CPT | Performed by: FAMILY MEDICINE

## 2021-06-04 PROCEDURE — 99213 OFFICE O/P EST LOW 20 MIN: CPT | Performed by: FAMILY MEDICINE

## 2021-06-04 RX ORDER — PREDNISONE 10 MG/1
TABLET ORAL
Qty: 50 TABLET | Refills: 0 | Status: SHIPPED | OUTPATIENT
Start: 2021-06-04 | End: 2021-06-23

## 2021-06-04 NOTE — PROGRESS NOTES
[facility] Family Medicine Office Note  Chief Complaint:   Patient presents with:   Follow - Up: [facility] 5/29 & 5/30 hives, resolved, pt would like allergy testing  Anemia: f/u      HPI:   This is a 41year old female coming in for  HPI  Follow up - h Medication Sig Dispense Refill   • Multiple Vitamin (MULTIVITAMIN ADULT OR) Take by mouth. • PROBIOTIC PRODUCT OR Take by mouth.      • predniSONE 10 MG Oral Tab Take 4 tablets daily for 5 days then 3 tablets daily for 5 days then 2 tablets daily for membranes are moist. No oral lesions. Pharynx: Oropharynx is clear. No pharyngeal swelling, posterior oropharyngeal erythema or uvula swelling. Tonsils: No tonsillar abscesses.    Cardiovascular:      Rate and Rhythm: Normal rate and regular rhyth

## 2021-06-23 ENCOUNTER — OFFICE VISIT (OUTPATIENT)
Dept: SURGERY | Facility: CLINIC | Age: 41
End: 2021-06-23
Payer: COMMERCIAL

## 2021-06-23 VITALS
WEIGHT: 153.19 LBS | SYSTOLIC BLOOD PRESSURE: 118 MMHG | BODY MASS INDEX: 24.04 KG/M2 | HEART RATE: 55 BPM | TEMPERATURE: 98 F | DIASTOLIC BLOOD PRESSURE: 70 MMHG | HEIGHT: 67 IN

## 2021-06-23 DIAGNOSIS — K64.8 HEMORRHOIDS WITH COMPLICATION: Primary | ICD-10-CM

## 2021-06-23 PROCEDURE — 3074F SYST BP LT 130 MM HG: CPT | Performed by: SURGERY

## 2021-06-23 PROCEDURE — 3008F BODY MASS INDEX DOCD: CPT | Performed by: SURGERY

## 2021-06-23 PROCEDURE — 3078F DIAST BP <80 MM HG: CPT | Performed by: SURGERY

## 2021-06-23 PROCEDURE — 46221 LIGATION OF HEMORRHOID(S): CPT | Performed by: SURGERY

## 2021-06-23 NOTE — PROCEDURES
Pre op diagnosis: Symptomatic Internal Hemorrhoids    Post op diagnosis:  Symptomatic Internal Hemorrhoids    Procedure: Anoscopy with O-ring Rubber Band Ligation of Internal Hemorrhoids, 9:00    Surgeon:  Dr. Ivonne Mcknight    Operative findings:    The

## 2021-06-23 NOTE — PROGRESS NOTES
Follow Up Visit Note       Active Problems      1. Hemorrhoids with complication          Chief Complaint   Patient presents with:  Hemorrhoid Bandinth banding - Pt feels last banding didn't take due to seeing the rubberband in the bowl.   Bleeding last Use      Smoking status: Never Smoker      Smokeless tobacco: Never Used    Substance and Sexual Activity      Alcohol use: Yes      Drug use: No    Other Topics      Concerns:       Current Outpatient Medications:   •  Multiple Vitamin (MULTIVITAMIN ADULT lb 3.2 oz (69.5 kg)   LMP 05/11/2021   BMI 23.99 kg/m²   Physical Exam  Vitals and nursing note reviewed. Constitutional:       Appearance: She is well-developed. HENT:      Head: Normocephalic and atraumatic. Eyes:      General: No scleral icterus.

## 2021-08-24 ENCOUNTER — APPOINTMENT (OUTPATIENT)
Dept: ULTRASOUND IMAGING | Age: 41
End: 2021-08-24
Attending: PHYSICIAN ASSISTANT
Payer: COMMERCIAL

## 2021-08-24 ENCOUNTER — HOSPITAL ENCOUNTER (OUTPATIENT)
Age: 41
Discharge: HOME | End: 2021-08-24
Payer: COMMERCIAL

## 2021-08-24 VITALS
HEIGHT: 67 IN | HEART RATE: 50 BPM | WEIGHT: 150 LBS | BODY MASS INDEX: 23.54 KG/M2 | TEMPERATURE: 98 F | DIASTOLIC BLOOD PRESSURE: 63 MMHG | SYSTOLIC BLOOD PRESSURE: 130 MMHG | OXYGEN SATURATION: 100 % | RESPIRATION RATE: 16 BRPM

## 2021-08-24 DIAGNOSIS — M79.662 PAIN OF LEFT CALF: Primary | ICD-10-CM

## 2021-08-24 PROCEDURE — 99203 OFFICE O/P NEW LOW 30 MIN: CPT | Performed by: PHYSICIAN ASSISTANT

## 2021-08-24 PROCEDURE — 93971 EXTREMITY STUDY: CPT | Performed by: PHYSICIAN ASSISTANT

## 2021-08-24 NOTE — ED PROVIDER NOTES
Patient Seen in: [facility] [address] [place] [address]      History   Patient presents with:  Deep Vein Thrombosis    Stated Complaint: TL-pain in calf    HPI/Subjective:   HPI    39-year-old female.   She noted some left calf discomfort yesterday while attending a exerc 130/63   Pulse 50   Temp 98.3 °F (36.8 °C) (Temporal)   Resp 16   Ht 170.2 cm (5' 7\")   Wt 68 kg   LMP 08/03/2021   SpO2 100%   BMI 23.49 kg/m²         Physical Exam    Gen: Well appearing, well groomed, alert and aware x 3  Neck: Supple, full range of mo MDM          Bilateral calves have equal circumference. Easily appreciable dorsal pedis pulse. Pain to palpation to the proximal lateral aspect of the left calf. No erythema or warmth. No induration. Normal vital signs. Under percent room air.

## 2021-08-24 NOTE — ED INITIAL ASSESSMENT (HPI)
8/23 Pt was in a workout class and heard a \"pop\" of her left calf, +c/o non radiating pain    Denies swelling, smoking, recent travel.     +Birth control    Ilir calf 14in

## 2022-03-25 ENCOUNTER — OFFICE VISIT (OUTPATIENT)
Dept: FAMILY MEDICINE CLINIC | Facility: CLINIC | Age: 42
End: 2022-03-25
Payer: COMMERCIAL

## 2022-03-25 VITALS
OXYGEN SATURATION: 99 % | HEIGHT: 67 IN | RESPIRATION RATE: 16 BRPM | TEMPERATURE: 98 F | WEIGHT: 155 LBS | SYSTOLIC BLOOD PRESSURE: 116 MMHG | BODY MASS INDEX: 24.33 KG/M2 | DIASTOLIC BLOOD PRESSURE: 70 MMHG | HEART RATE: 50 BPM

## 2022-03-25 DIAGNOSIS — Z00.00 LABORATORY EXAMINATION ORDERED AS PART OF A ROUTINE GENERAL MEDICAL EXAMINATION: ICD-10-CM

## 2022-03-25 DIAGNOSIS — K58.0 IRRITABLE BOWEL SYNDROME WITH DIARRHEA: ICD-10-CM

## 2022-03-25 DIAGNOSIS — Z12.31 ENCOUNTER FOR SCREENING MAMMOGRAM FOR MALIGNANT NEOPLASM OF BREAST: ICD-10-CM

## 2022-03-25 DIAGNOSIS — Z00.00 ROUTINE PHYSICAL EXAMINATION: Primary | ICD-10-CM

## 2022-03-25 DIAGNOSIS — D50.0 IRON DEFICIENCY ANEMIA DUE TO CHRONIC BLOOD LOSS: ICD-10-CM

## 2022-03-25 PROCEDURE — 99396 PREV VISIT EST AGE 40-64: CPT | Performed by: FAMILY MEDICINE

## 2022-03-25 PROCEDURE — 3008F BODY MASS INDEX DOCD: CPT | Performed by: FAMILY MEDICINE

## 2022-03-25 PROCEDURE — 3074F SYST BP LT 130 MM HG: CPT | Performed by: FAMILY MEDICINE

## 2022-03-25 PROCEDURE — 3078F DIAST BP <80 MM HG: CPT | Performed by: FAMILY MEDICINE

## 2022-03-25 NOTE — PATIENT INSTRUCTIONS
Health Maintenance    Health and Safety   Eat healthy well balanced diet - majority should be protein and vegetables  Get at least 150 min of exercise per week (30 min/5 days)  Wear sunscreen - SPF 30 or higher and reapply every 2 hours. Wear seat belts and drive safely. Schedule regular appointments with dentist.  Schedule yearly eye exam if you wear glasses/contacts. Vaccinations   Yearly Flu Vaccine recommended for everyone over the age of 7 months   Tetanus, Diptheria and Pertussis vaccine should be given to adults every 7-10 years. Adults 50+ are recommended to get shingles vaccine, Shingrix (2 doses  by 2-6 months). Covid-19 vaccine is recommended.  It is safe and effective at decreasing the risk of disease and complications (including need for mechanical ventilation and death)  Pneumonia vaccines (Pneumovax 23 and Prevnar 13) are recommended for all adults 65+    Colon Cancer screening  The American Cancer Society recommends screening adults 45 and over

## 2022-03-26 LAB
% SATURATION: 12 % (CALC) (ref 16–45)
ABSOLUTE BASOPHILS: 30 CELLS/UL (ref 0–200)
ABSOLUTE EOSINOPHILS: 72 CELLS/UL (ref 15–500)
ABSOLUTE LYMPHOCYTES: 2178 CELLS/UL (ref 850–3900)
ABSOLUTE MONOCYTES: 408 CELLS/UL (ref 200–950)
ABSOLUTE NEUTROPHILS: 3312 CELLS/UL (ref 1500–7800)
ALBUMIN/GLOBULIN RATIO: 1.7 (CALC) (ref 1–2.5)
ALBUMIN: 4.4 G/DL (ref 3.6–5.1)
ALKALINE PHOSPHATASE: 41 U/L (ref 31–125)
ALT: 16 U/L (ref 6–29)
ANA SCREEN, IFA: NEGATIVE
AST: 20 U/L (ref 10–30)
BASOPHILS: 0.5 %
BILIRUBIN, TOTAL: 0.3 MG/DL (ref 0.2–1.2)
BUN: 18 MG/DL (ref 7–25)
CALCIUM: 9.6 MG/DL (ref 8.6–10.2)
CARBON DIOXIDE: 28 MMOL/L (ref 20–32)
CHLORIDE: 103 MMOL/L (ref 98–110)
CHOL/HDLC RATIO: 2.2 (CALC)
CHOLESTEROL, TOTAL: 186 MG/DL
CREATININE: 0.87 MG/DL (ref 0.5–1.1)
EGFR IF AFRICN AM: 95 ML/MIN/1.73M2
EGFR IF NONAFRICN AM: 82 ML/MIN/1.73M2
EOSINOPHILS: 1.2 %
FERRITIN: 13 NG/ML (ref 16–232)
FOLATE, SERUM: >24 NG/ML
GLOBULIN: 2.6 G/DL (CALC) (ref 1.9–3.7)
GLUCOSE: 78 MG/DL (ref 65–99)
HDL CHOLESTEROL: 85 MG/DL
HEMATOCRIT: 42.7 % (ref 35–45)
HEMOGLOBIN: 14 G/DL (ref 11.7–15.5)
IRON BINDING CAPACITY: 493 MCG/DL (CALC) (ref 250–450)
IRON, TOTAL: 60 MCG/DL (ref 40–190)
LDL-CHOLESTEROL: 87 MG/DL (CALC)
LYMPHOCYTES: 36.3 %
MCH: 30 PG (ref 27–33)
MCHC: 32.8 G/DL (ref 32–36)
MCV: 91.4 FL (ref 80–100)
MONOCYTES: 6.8 %
MPV: 9.9 FL (ref 7.5–12.5)
NEUTROPHILS: 55.2 %
NON-HDL CHOLESTEROL: 101 MG/DL (CALC)
PLATELET COUNT: 282 THOUSAND/UL (ref 140–400)
POTASSIUM: 4.3 MMOL/L (ref 3.5–5.3)
PROTEIN, TOTAL: 7 G/DL (ref 6.1–8.1)
RDW: 13.6 % (ref 11–15)
RED BLOOD CELL COUNT: 4.67 MILLION/UL (ref 3.8–5.1)
SODIUM: 138 MMOL/L (ref 135–146)
TRIGLYCERIDES: 55 MG/DL
TSH W/REFLEX TO FT4: 1.75 MIU/L
VITAMIN B12: 406 PG/ML (ref 200–1100)
WHITE BLOOD CELL COUNT: 6 THOUSAND/UL (ref 3.8–10.8)

## 2022-04-04 RX ORDER — NORGESTIMATE AND ETHINYL ESTRADIOL 7DAYSX3 28
KIT ORAL
Qty: 84 TABLET | Refills: 3 | Status: SHIPPED | OUTPATIENT
Start: 2022-04-04

## 2022-05-16 RX ORDER — NORGESTIMATE AND ETHINYL ESTRADIOL 7DAYSX3 28
1 KIT ORAL DAILY
Qty: 84 TABLET | Refills: 3 | OUTPATIENT
Start: 2022-05-16

## 2022-05-16 RX ORDER — NORGESTIMATE AND ETHINYL ESTRADIOL 7DAYSX3 28
1 KIT ORAL DAILY
Qty: 84 TABLET | Refills: 3 | Status: SHIPPED | OUTPATIENT
Start: 2022-05-16

## 2023-03-06 RX ORDER — NORGESTIMATE AND ETHINYL ESTRADIOL 7DAYSX3 28
1 KIT ORAL DAILY
Qty: 84 TABLET | Refills: 3 | OUTPATIENT
Start: 2023-03-06

## 2023-03-24 DIAGNOSIS — Z00.00 LABORATORY EXAMINATION ORDERED AS PART OF A ROUTINE GENERAL MEDICAL EXAMINATION: Primary | ICD-10-CM

## 2023-03-24 DIAGNOSIS — Z13.21 ENCOUNTER FOR VITAMIN DEFICIENCY SCREENING: ICD-10-CM

## 2023-03-24 DIAGNOSIS — D50.0 IRON DEFICIENCY ANEMIA DUE TO CHRONIC BLOOD LOSS: ICD-10-CM

## 2023-03-24 DIAGNOSIS — E53.8 VITAMIN B12 DEFICIENCY: ICD-10-CM

## 2023-03-31 ENCOUNTER — LAB ENCOUNTER (OUTPATIENT)
Dept: LAB | Age: 43
End: 2023-03-31
Attending: FAMILY MEDICINE
Payer: COMMERCIAL

## 2023-03-31 ENCOUNTER — OFFICE VISIT (OUTPATIENT)
Dept: FAMILY MEDICINE CLINIC | Facility: CLINIC | Age: 43
End: 2023-03-31
Payer: COMMERCIAL

## 2023-03-31 VITALS
HEART RATE: 70 BPM | BODY MASS INDEX: 24.17 KG/M2 | OXYGEN SATURATION: 100 % | TEMPERATURE: 98 F | DIASTOLIC BLOOD PRESSURE: 64 MMHG | HEIGHT: 67 IN | WEIGHT: 154 LBS | RESPIRATION RATE: 21 BRPM | SYSTOLIC BLOOD PRESSURE: 110 MMHG

## 2023-03-31 DIAGNOSIS — E53.8 VITAMIN B12 DEFICIENCY: ICD-10-CM

## 2023-03-31 DIAGNOSIS — Z00.00 ROUTINE PHYSICAL EXAMINATION: Primary | ICD-10-CM

## 2023-03-31 DIAGNOSIS — Z12.31 ENCOUNTER FOR SCREENING MAMMOGRAM FOR MALIGNANT NEOPLASM OF BREAST: ICD-10-CM

## 2023-03-31 DIAGNOSIS — Z00.00 LABORATORY EXAMINATION ORDERED AS PART OF A ROUTINE GENERAL MEDICAL EXAMINATION: ICD-10-CM

## 2023-03-31 DIAGNOSIS — D50.0 IRON DEFICIENCY ANEMIA DUE TO CHRONIC BLOOD LOSS: ICD-10-CM

## 2023-03-31 DIAGNOSIS — Z13.21 ENCOUNTER FOR VITAMIN DEFICIENCY SCREENING: ICD-10-CM

## 2023-03-31 DIAGNOSIS — K58.0 IRRITABLE BOWEL SYNDROME WITH DIARRHEA: ICD-10-CM

## 2023-03-31 LAB
ALBUMIN SERPL-MCNC: 3.5 G/DL (ref 3.4–5)
ALBUMIN/GLOB SERPL: 1.1 {RATIO} (ref 1–2)
ALP LIVER SERPL-CCNC: 41 U/L
ALT SERPL-CCNC: 24 U/L
ANION GAP SERPL CALC-SCNC: 7 MMOL/L (ref 0–18)
AST SERPL-CCNC: 25 U/L (ref 15–37)
BASOPHILS # BLD AUTO: 0.04 X10(3) UL (ref 0–0.2)
BASOPHILS NFR BLD AUTO: 0.7 %
BILIRUB SERPL-MCNC: 0.4 MG/DL (ref 0.1–2)
BUN BLD-MCNC: 18 MG/DL (ref 7–18)
CALCIUM BLD-MCNC: 8.6 MG/DL (ref 8.5–10.1)
CHLORIDE SERPL-SCNC: 108 MMOL/L (ref 98–112)
CHOLEST SERPL-MCNC: 172 MG/DL (ref ?–200)
CO2 SERPL-SCNC: 22 MMOL/L (ref 21–32)
CREAT BLD-MCNC: 1 MG/DL
DEPRECATED HBV CORE AB SER IA-ACNC: 3.4 NG/ML
EOSINOPHIL # BLD AUTO: 0.14 X10(3) UL (ref 0–0.7)
EOSINOPHIL NFR BLD AUTO: 2.4 %
ERYTHROCYTE [DISTWIDTH] IN BLOOD BY AUTOMATED COUNT: 14 %
FASTING PATIENT LIPID ANSWER: YES
FASTING STATUS PATIENT QL REPORTED: YES
GFR SERPLBLD BASED ON 1.73 SQ M-ARVRAT: 71 ML/MIN/1.73M2 (ref 60–?)
GLOBULIN PLAS-MCNC: 3.3 G/DL (ref 2.8–4.4)
GLUCOSE BLD-MCNC: 79 MG/DL (ref 70–99)
HCT VFR BLD AUTO: 34.2 %
HDLC SERPL-MCNC: 87 MG/DL (ref 40–59)
HGB BLD-MCNC: 11 G/DL
IMM GRANULOCYTES # BLD AUTO: 0.02 X10(3) UL (ref 0–1)
IMM GRANULOCYTES NFR BLD: 0.3 %
IRON SATN MFR SERPL: 7 %
IRON SERPL-MCNC: 41 UG/DL
LDLC SERPL CALC-MCNC: 76 MG/DL (ref ?–100)
LYMPHOCYTES # BLD AUTO: 1.96 X10(3) UL (ref 1–4)
LYMPHOCYTES NFR BLD AUTO: 33 %
MCH RBC QN AUTO: 26.8 PG (ref 26–34)
MCHC RBC AUTO-ENTMCNC: 32.2 G/DL (ref 31–37)
MCV RBC AUTO: 83.4 FL
MONOCYTES # BLD AUTO: 0.47 X10(3) UL (ref 0.1–1)
MONOCYTES NFR BLD AUTO: 7.9 %
NEUTROPHILS # BLD AUTO: 3.31 X10 (3) UL (ref 1.5–7.7)
NEUTROPHILS # BLD AUTO: 3.31 X10(3) UL (ref 1.5–7.7)
NEUTROPHILS NFR BLD AUTO: 55.7 %
NONHDLC SERPL-MCNC: 85 MG/DL (ref ?–130)
OSMOLALITY SERPL CALC.SUM OF ELEC: 285 MOSM/KG (ref 275–295)
PLATELET # BLD AUTO: 329 10(3)UL (ref 150–450)
POTASSIUM SERPL-SCNC: 3.9 MMOL/L (ref 3.5–5.1)
PROT SERPL-MCNC: 6.8 G/DL (ref 6.4–8.2)
RBC # BLD AUTO: 4.1 X10(6)UL
SODIUM SERPL-SCNC: 137 MMOL/L (ref 136–145)
TIBC SERPL-MCNC: 592 UG/DL (ref 240–450)
TRANSFERRIN SERPL-MCNC: 397 MG/DL (ref 200–360)
TRIGL SERPL-MCNC: 44 MG/DL (ref 30–149)
TSI SER-ACNC: 1.39 MIU/ML (ref 0.36–3.74)
VIT B12 SERPL-MCNC: 438 PG/ML (ref 193–986)
VIT D+METAB SERPL-MCNC: 41.7 NG/ML (ref 30–100)
VLDLC SERPL CALC-MCNC: 7 MG/DL (ref 0–30)
WBC # BLD AUTO: 5.9 X10(3) UL (ref 4–11)

## 2023-03-31 PROCEDURE — 82306 VITAMIN D 25 HYDROXY: CPT | Performed by: FAMILY MEDICINE

## 2023-03-31 PROCEDURE — 3074F SYST BP LT 130 MM HG: CPT | Performed by: FAMILY MEDICINE

## 2023-03-31 PROCEDURE — 80050 GENERAL HEALTH PANEL: CPT | Performed by: FAMILY MEDICINE

## 2023-03-31 PROCEDURE — 3078F DIAST BP <80 MM HG: CPT | Performed by: FAMILY MEDICINE

## 2023-03-31 PROCEDURE — 83540 ASSAY OF IRON: CPT | Performed by: FAMILY MEDICINE

## 2023-03-31 PROCEDURE — 82728 ASSAY OF FERRITIN: CPT | Performed by: FAMILY MEDICINE

## 2023-03-31 PROCEDURE — 99396 PREV VISIT EST AGE 40-64: CPT | Performed by: FAMILY MEDICINE

## 2023-03-31 PROCEDURE — 83550 IRON BINDING TEST: CPT | Performed by: FAMILY MEDICINE

## 2023-03-31 PROCEDURE — 3008F BODY MASS INDEX DOCD: CPT | Performed by: FAMILY MEDICINE

## 2023-03-31 PROCEDURE — 82607 VITAMIN B-12: CPT | Performed by: FAMILY MEDICINE

## 2023-03-31 PROCEDURE — 80061 LIPID PANEL: CPT | Performed by: FAMILY MEDICINE

## 2023-04-04 ENCOUNTER — TELEPHONE (OUTPATIENT)
Dept: FAMILY MEDICINE CLINIC | Facility: CLINIC | Age: 43
End: 2023-04-04

## 2023-04-04 NOTE — TELEPHONE ENCOUNTER
Patient notified of lab results by PCP via [address] [email or web address] message read. Lab orders placed on 4/2/2023.

## 2023-04-04 NOTE — TELEPHONE ENCOUNTER
----- Message from Braden Hutton MD sent at 4/2/2023  1:11 PM CDT -----  Results reviewed. Released to [address]    Low iron, otherwise normal.   Inc supplement otc - recheck in 3 mo

## 2023-06-30 ENCOUNTER — LAB ENCOUNTER (OUTPATIENT)
Dept: LAB | Age: 43
End: 2023-06-30
Attending: FAMILY MEDICINE
Payer: COMMERCIAL

## 2023-06-30 DIAGNOSIS — D50.0 IRON DEFICIENCY ANEMIA DUE TO CHRONIC BLOOD LOSS: ICD-10-CM

## 2023-06-30 LAB
DEPRECATED HBV CORE AB SER IA-ACNC: 18.7 NG/ML
ERYTHROCYTE [DISTWIDTH] IN BLOOD BY AUTOMATED COUNT: 14.4 %
HCT VFR BLD AUTO: 43.7 %
HGB BLD-MCNC: 15.2 G/DL
IRON SATN MFR SERPL: 35 %
IRON SERPL-MCNC: 185 UG/DL
MCH RBC QN AUTO: 32 PG (ref 26–34)
MCHC RBC AUTO-ENTMCNC: 34.8 G/DL (ref 31–37)
MCV RBC AUTO: 92 FL
PLATELET # BLD AUTO: 261 10(3)UL (ref 150–450)
RBC # BLD AUTO: 4.75 X10(6)UL
TIBC SERPL-MCNC: 522 UG/DL (ref 240–450)
TRANSFERRIN SERPL-MCNC: 350 MG/DL (ref 200–360)
WBC # BLD AUTO: 5.2 X10(3) UL (ref 4–11)

## 2023-06-30 PROCEDURE — 82728 ASSAY OF FERRITIN: CPT | Performed by: FAMILY MEDICINE

## 2023-06-30 PROCEDURE — 85027 COMPLETE CBC AUTOMATED: CPT | Performed by: FAMILY MEDICINE

## 2023-06-30 PROCEDURE — 83540 ASSAY OF IRON: CPT | Performed by: FAMILY MEDICINE

## 2023-06-30 PROCEDURE — 83550 IRON BINDING TEST: CPT | Performed by: FAMILY MEDICINE

## 2024-03-22 ENCOUNTER — TELEPHONE (OUTPATIENT)
Dept: FAMILY MEDICINE CLINIC | Facility: CLINIC | Age: 44
End: 2024-03-22

## 2024-03-22 DIAGNOSIS — Z00.00 ROUTINE GENERAL MEDICAL EXAMINATION AT A HEALTH CARE FACILITY: ICD-10-CM

## 2024-03-22 DIAGNOSIS — D50.0 IRON DEFICIENCY ANEMIA DUE TO CHRONIC BLOOD LOSS: Primary | ICD-10-CM

## 2024-04-17 RX ORDER — NORGESTIMATE AND ETHINYL ESTRADIOL 7DAYSX3 28
1 KIT ORAL DAILY
Qty: 84 TABLET | Refills: 0 | Status: SHIPPED | OUTPATIENT
Start: 2024-04-17

## 2024-04-19 ENCOUNTER — LAB ENCOUNTER (OUTPATIENT)
Dept: LAB | Age: 44
End: 2024-04-19
Attending: FAMILY MEDICINE
Payer: COMMERCIAL

## 2024-04-19 DIAGNOSIS — D50.0 IRON DEFICIENCY ANEMIA DUE TO CHRONIC BLOOD LOSS: ICD-10-CM

## 2024-04-19 DIAGNOSIS — Z00.00 ROUTINE GENERAL MEDICAL EXAMINATION AT A HEALTH CARE FACILITY: ICD-10-CM

## 2024-04-19 LAB
ALBUMIN SERPL-MCNC: 3.6 G/DL (ref 3.4–5)
ALBUMIN/GLOB SERPL: 1.1 {RATIO} (ref 1–2)
ALP LIVER SERPL-CCNC: 40 U/L
ALT SERPL-CCNC: 25 U/L
ANION GAP SERPL CALC-SCNC: 6 MMOL/L (ref 0–18)
AST SERPL-CCNC: 14 U/L (ref 15–37)
BASOPHILS # BLD AUTO: 0.03 X10(3) UL (ref 0–0.2)
BASOPHILS NFR BLD AUTO: 0.6 %
BILIRUB SERPL-MCNC: 0.5 MG/DL (ref 0.1–2)
BUN BLD-MCNC: 13 MG/DL (ref 9–23)
CALCIUM BLD-MCNC: 8.9 MG/DL (ref 8.5–10.1)
CHLORIDE SERPL-SCNC: 111 MMOL/L (ref 98–112)
CHOLEST SERPL-MCNC: 164 MG/DL (ref ?–200)
CO2 SERPL-SCNC: 24 MMOL/L (ref 21–32)
CREAT BLD-MCNC: 1.24 MG/DL
DEPRECATED HBV CORE AB SER IA-ACNC: 12.2 NG/ML
EGFRCR SERPLBLD CKD-EPI 2021: 55 ML/MIN/1.73M2 (ref 60–?)
EOSINOPHIL # BLD AUTO: 0.07 X10(3) UL (ref 0–0.7)
EOSINOPHIL NFR BLD AUTO: 1.3 %
ERYTHROCYTE [DISTWIDTH] IN BLOOD BY AUTOMATED COUNT: 12.2 %
FASTING PATIENT LIPID ANSWER: YES
FASTING STATUS PATIENT QL REPORTED: YES
GLOBULIN PLAS-MCNC: 3.3 G/DL (ref 2.8–4.4)
GLUCOSE BLD-MCNC: 80 MG/DL (ref 70–99)
HCT VFR BLD AUTO: 40.4 %
HDLC SERPL-MCNC: 98 MG/DL (ref 40–59)
HGB BLD-MCNC: 14.3 G/DL
IMM GRANULOCYTES # BLD AUTO: 0.01 X10(3) UL (ref 0–1)
IMM GRANULOCYTES NFR BLD: 0.2 %
IRON SATN MFR SERPL: 61 %
IRON SERPL-MCNC: 288 UG/DL
LDLC SERPL CALC-MCNC: 57 MG/DL (ref ?–100)
LYMPHOCYTES # BLD AUTO: 1.53 X10(3) UL (ref 1–4)
LYMPHOCYTES NFR BLD AUTO: 29 %
MCH RBC QN AUTO: 33.2 PG (ref 26–34)
MCHC RBC AUTO-ENTMCNC: 35.4 G/DL (ref 31–37)
MCV RBC AUTO: 93.7 FL
MONOCYTES # BLD AUTO: 0.41 X10(3) UL (ref 0.1–1)
MONOCYTES NFR BLD AUTO: 7.8 %
NEUTROPHILS # BLD AUTO: 3.22 X10 (3) UL (ref 1.5–7.7)
NEUTROPHILS # BLD AUTO: 3.22 X10(3) UL (ref 1.5–7.7)
NEUTROPHILS NFR BLD AUTO: 61.1 %
NONHDLC SERPL-MCNC: 66 MG/DL (ref ?–130)
OSMOLALITY SERPL CALC.SUM OF ELEC: 291 MOSM/KG (ref 275–295)
PLATELET # BLD AUTO: 257 10(3)UL (ref 150–450)
POTASSIUM SERPL-SCNC: 4 MMOL/L (ref 3.5–5.1)
PROT SERPL-MCNC: 6.9 G/DL (ref 6.4–8.2)
RBC # BLD AUTO: 4.31 X10(6)UL
SODIUM SERPL-SCNC: 141 MMOL/L (ref 136–145)
TIBC SERPL-MCNC: 474 UG/DL (ref 240–450)
TRANSFERRIN SERPL-MCNC: 318 MG/DL (ref 200–360)
TRIGL SERPL-MCNC: 36 MG/DL (ref 30–149)
TSI SER-ACNC: 1.67 MIU/ML (ref 0.36–3.74)
VLDLC SERPL CALC-MCNC: 5 MG/DL (ref 0–30)
WBC # BLD AUTO: 5.3 X10(3) UL (ref 4–11)

## 2024-04-19 PROCEDURE — 82728 ASSAY OF FERRITIN: CPT | Performed by: FAMILY MEDICINE

## 2024-04-19 PROCEDURE — 83540 ASSAY OF IRON: CPT | Performed by: FAMILY MEDICINE

## 2024-04-19 PROCEDURE — 80050 GENERAL HEALTH PANEL: CPT | Performed by: FAMILY MEDICINE

## 2024-04-19 PROCEDURE — 80061 LIPID PANEL: CPT | Performed by: FAMILY MEDICINE

## 2024-04-19 PROCEDURE — 83550 IRON BINDING TEST: CPT | Performed by: FAMILY MEDICINE

## 2024-04-26 ENCOUNTER — OFFICE VISIT (OUTPATIENT)
Dept: FAMILY MEDICINE CLINIC | Facility: CLINIC | Age: 44
End: 2024-04-26
Payer: COMMERCIAL

## 2024-04-26 VITALS
RESPIRATION RATE: 17 BRPM | BODY MASS INDEX: 25.11 KG/M2 | TEMPERATURE: 98 F | OXYGEN SATURATION: 100 % | HEART RATE: 60 BPM | SYSTOLIC BLOOD PRESSURE: 110 MMHG | DIASTOLIC BLOOD PRESSURE: 64 MMHG | HEIGHT: 67 IN | WEIGHT: 160 LBS

## 2024-04-26 DIAGNOSIS — Z12.4 SCREENING FOR CERVICAL CANCER: ICD-10-CM

## 2024-04-26 DIAGNOSIS — Z12.31 ENCOUNTER FOR SCREENING MAMMOGRAM FOR MALIGNANT NEOPLASM OF BREAST: ICD-10-CM

## 2024-04-26 DIAGNOSIS — Z01.419 WELL WOMAN EXAM WITH ROUTINE GYNECOLOGICAL EXAM: Primary | ICD-10-CM

## 2024-04-26 DIAGNOSIS — D50.9 IRON DEFICIENCY ANEMIA, UNSPECIFIED IRON DEFICIENCY ANEMIA TYPE: ICD-10-CM

## 2024-04-26 PROCEDURE — 3074F SYST BP LT 130 MM HG: CPT | Performed by: FAMILY MEDICINE

## 2024-04-26 PROCEDURE — 87624 HPV HI-RISK TYP POOLED RSLT: CPT | Performed by: FAMILY MEDICINE

## 2024-04-26 PROCEDURE — 99396 PREV VISIT EST AGE 40-64: CPT | Performed by: FAMILY MEDICINE

## 2024-04-26 PROCEDURE — 3008F BODY MASS INDEX DOCD: CPT | Performed by: FAMILY MEDICINE

## 2024-04-26 PROCEDURE — 3078F DIAST BP <80 MM HG: CPT | Performed by: FAMILY MEDICINE

## 2024-04-26 RX ORDER — FLUOXETINE 10 MG/1
10 CAPSULE ORAL DAILY
Qty: 90 CAPSULE | Refills: 1 | Status: SHIPPED | OUTPATIENT
Start: 2024-04-26

## 2024-04-26 NOTE — PROGRESS NOTES
Mariama Haines is a 43 year old female.    CC:    Chief Complaint   Patient presents with    Well Adult       HPI:  Wellness     Exercise: Yes  Drinks water: Yes  Eat variety of fruits & vegetables, lean meats: Yes  Issues with sleep: No  Regular dental exams: Yes  Change in size/consistency of stool: No  Change in appearance of mole: No    Gyne Hx  OB History    Para Term  AB Living   0 0 0 0 0 0   SAB IAB Ectopic Multiple Live Births   0 0 0 0 0     Patient's last menstrual period was 2024.  Period: regular on OCP, and light   Sexual activity:monogamy   Contraception: OCP   Previous pap: normal     CAGE Alcohol Screening       2024     2:36 PM   CAGE Flowsheet   Have you ever felt you should Cut down on your drinking? 0   Have people Annoyed you by criticizing your drinking? 0   Have you ever felt bad or Guilty about your drinking? 0   Have you ever had a drink first thing in the morning to steady your nerves or to get rid of a hangover (Eye opener)? 0   Total Score: Item responses on the CAGE are scored 0 or 1, with a higher score an indication of alcohol 0   Total Score: Item responses on the CAGE are scored 0 or 1, with a higher score an indication of alcohol No Risk        Depression Screening (PHQ-2/PHQ-9): Over the LAST 2 WEEKS   Little interest or pleasure in doing things: Not at all    Feeling down, depressed, or hopeless: Not at all    PHQ-2 SCORE: 0          Pool Suicide Severity Rating Scale Screener   In what setting is the screener performed?: an office visit  1. Have you wished you were dead or wished you could go to sleep and not wake up? (past 30 days): No  2. Have you actually had any thoughts of killing yourself? (past 30 days): No  6. Have you ever done anything, started to do anything, or prepared to do anything to end your life? (lifetime): No  Score and Suggested Actions - Office Visit: No Risk  Score and Intervention  Score and Suggested Actions -  Office Visit: No Risk          Allergies:  Allergies   Allergen Reactions    Doxycycline RASH      Current Meds:  Current Outpatient Medications on File Prior to Visit   Medication Sig Dispense Refill    Ferrous Sulfate ER (SLOW FE) 142 (45 Fe) MG Oral Tab CR       NORGESTIM-ETH ESTRAD TRIPHASIC 0.18/0.215/0.25 MG-35 MCG Oral Tab TAKE 1 TABLET BY MOUTH EVERY DAY 84 tablet 0    Vitamin D-Vitamin K (VITAMIN K2-VITAMIN D3 OR)       Multiple Vitamin (MULTIVITAMIN ADULT OR) Take by mouth.      PROBIOTIC PRODUCT OR Take by mouth.       No current facility-administered medications on file prior to visit.        History:  Past Medical History:    Abdominal pain    Anemia    Bloating    Blood in the stool    Body piercing    Change in hair    Nails brittle    Decorative tattoo    Depression    Diarrhea, unspecified    Easy bruising    Always noticed that I bruise easier than others    Excessive bleeding    Fatigue    Flatulence/gas pain/belching    Frequent UTI    Headache disorder    Hemorrhoids    Leg swelling    Rash    eczema by left ear      Past Surgical History:   Procedure Laterality Date    Colonoscopy  May 2016      Family History   Problem Relation Age of Onset    Diabetes Maternal Grandmother     Stroke Maternal Grandfather       Family Status   Relation Status    Fa Alive    Mo Alive    MGMA     MGFA     PGMA     PGFA       Social History     Socioeconomic History    Marital status:    Tobacco Use    Smoking status: Never    Smokeless tobacco: Never   Vaping Use    Vaping status: Never Used   Substance and Sexual Activity    Alcohol use: Yes     Alcohol/week: 3.0 standard drinks of alcohol     Types: 3 Standard drinks or equivalent per week    Drug use: No   Other Topics Concern    Caffeine Concern No    Exercise No    Seat Belt No    Special Diet No    Stress Concern No    Weight Concern No            Immunization History   Administered Date(s) Administered    Covid-19  Vaccine Pfizer 30 mcg/0.3 ml 03/21/2021, 04/11/2021, 12/13/2021    TDAP 03/18/2020       Wt Readings from Last 6 Encounters:   04/26/24 160 lb (72.6 kg)   03/31/23 154 lb (69.9 kg)   03/25/22 155 lb (70.3 kg)   08/24/21 150 lb (68 kg)   06/23/21 153 lb 3.2 oz (69.5 kg)   06/04/21 148 lb (67.1 kg)       BP Readings from Last 3 Encounters:   04/26/24 110/64   03/31/23 110/64   03/25/22 116/70       REVIEW OF SYSTEMS:   Review of Systems   Constitutional:  Negative for chills, fever and malaise/fatigue.   HENT:  Negative for congestion, ear pain and sore throat.    Eyes:  Negative for blurred vision, double vision and pain.   Respiratory:  Negative for cough, shortness of breath and wheezing.    Cardiovascular:  Negative for chest pain, palpitations and leg swelling.   Gastrointestinal:  Positive for constipation, diarrhea and nausea. Negative for abdominal pain, blood in stool, melena and vomiting.   Genitourinary:  Negative for dysuria, flank pain and frequency.   Musculoskeletal:  Negative for back pain, joint pain and myalgias.   Skin:  Negative for itching and rash.   Neurological:  Negative for dizziness, weakness and headaches.   Endo/Heme/Allergies:  Negative for environmental allergies and polydipsia. Does not bruise/bleed easily.   Psychiatric/Behavioral:  Negative for depression. The patient is not nervous/anxious and does not have insomnia.        EXAM:   /64   Pulse 60   Temp 98 °F (36.7 °C)   Resp 17   Ht 5' 7\" (1.702 m)   Wt 160 lb (72.6 kg)   LMP 04/02/2024   SpO2 100%   BMI 25.06 kg/m²   Body mass index is 25.06 kg/m².  Patient's last menstrual period was 04/02/2024.    Physical Exam  Constitutional:       General: She is not in acute distress.     Appearance: Normal appearance.   HENT:      Right Ear: Tympanic membrane normal.      Left Ear: Tympanic membrane normal.      Mouth/Throat:      Mouth: Mucous membranes are moist.      Pharynx: Oropharynx is clear. No posterior oropharyngeal  erythema.   Eyes:      Extraocular Movements: Extraocular movements intact.      Conjunctiva/sclera: Conjunctivae normal.      Pupils: Pupils are equal, round, and reactive to light.   Cardiovascular:      Rate and Rhythm: Normal rate and regular rhythm.      Pulses: Normal pulses.      Heart sounds: Normal heart sounds.   Pulmonary:      Effort: Pulmonary effort is normal.      Breath sounds: Normal breath sounds. No wheezing or rhonchi.   Abdominal:      General: Abdomen is flat. Bowel sounds are normal.      Palpations: Abdomen is soft.      Tenderness: There is no abdominal tenderness. There is no guarding.   Musculoskeletal:         General: No swelling, tenderness, deformity or signs of injury.      Cervical back: Neck supple.   Lymphadenopathy:      Cervical: No cervical adenopathy.   Skin:     General: Skin is warm and dry.      Findings: No rash.   Neurological:      General: No focal deficit present.      Mental Status: She is alert and oriented to person, place, and time.      Motor: No weakness.   Psychiatric:         Mood and Affect: Mood normal.         Behavior: Behavior normal.         Thought Content: Thought content normal.                ASSESSMENT/PLAN:      1. Well woman exam with routine gynecological exam  Labs reviewed     2. Encounter for screening mammogram for malignant neoplasm of breast  - [facility] 2D+3D SCREENING BILAT (CPT=77067/32902); Future    3. Screening for cervical cancer  - ThinPrep PAP Smear  - Hpv Dna  High Risk , Thin Prep Collect    4. Iron deficiency anemia, unspecified iron deficiency anemia type  - Iron And Tibc; Future  Dec iron      Try SSRI for IBS like sx       Health Maintenance   Topic Date Due    Mammogram  02/01/2020    COVID-19 Vaccine (4 - 2023-24 season) 09/01/2023    Pap Smear  03/22/2024    Annual Physical  03/31/2024    Influenza Vaccine (Season Ended) 10/01/2024    DTaP,Tdap,and Td Vaccines (2 - Td or Tdap) 03/18/2030    Colorectal Cancer Screening   03/24/2031    Annual Depression Screening  Completed    Pneumococcal Vaccine: Birth to 64yrs  Aged Out         Healthy diet and regular exercise discussed     Meds & Refills for this Visit:  Requested Prescriptions     Signed Prescriptions Disp Refills    FLUoxetine 10 MG Oral Cap 90 capsule 1     Sig: Take 1 capsule (10 mg total) by mouth daily.         Imaging & Consults:  [facility] 2D+3D SCREENING BILAT (CPT=77067/07745)    Return in about 3-6 months

## 2024-04-26 NOTE — PATIENT INSTRUCTIONS
Decrease iron to once per week   Recheck iron level in 1-2 months     Try fluoxetine (prozac) for irritable bowel syndrome     Try anti-inflammatory diet

## 2024-04-27 ENCOUNTER — TELEPHONE (OUTPATIENT)
Dept: FAMILY MEDICINE CLINIC | Facility: CLINIC | Age: 44
End: 2024-04-27

## 2024-04-27 NOTE — TELEPHONE ENCOUNTER
Patient dropped off Wellness physical form that needs to be filled out by Dr. Lloyd. Patient had physical on 4/26/24 with Dr. Lloyd. Routed original form to doctor's bin and created  a copy.

## 2024-04-29 LAB — HPV I/H RISK 1 DNA SPEC QL NAA+PROBE: NEGATIVE

## 2024-05-01 ENCOUNTER — MED REC SCAN ONLY (OUTPATIENT)
Dept: FAMILY MEDICINE CLINIC | Facility: CLINIC | Age: 44
End: 2024-05-01

## 2024-05-02 LAB
.: NORMAL
.: NORMAL

## 2024-06-07 ENCOUNTER — LABORATORY ENCOUNTER (OUTPATIENT)
Dept: LAB | Age: 44
End: 2024-06-07
Attending: FAMILY MEDICINE
Payer: COMMERCIAL

## 2024-06-07 DIAGNOSIS — D50.9 IRON DEFICIENCY ANEMIA, UNSPECIFIED IRON DEFICIENCY ANEMIA TYPE: ICD-10-CM

## 2024-06-07 LAB
IRON SATN MFR SERPL: 16 %
IRON SERPL-MCNC: 82 UG/DL
TIBC SERPL-MCNC: 527 UG/DL (ref 240–450)
TRANSFERRIN SERPL-MCNC: 354 MG/DL (ref 200–360)

## 2024-06-07 PROCEDURE — 83550 IRON BINDING TEST: CPT | Performed by: FAMILY MEDICINE

## 2024-06-07 PROCEDURE — 83540 ASSAY OF IRON: CPT | Performed by: FAMILY MEDICINE

## 2024-07-08 RX ORDER — NORGESTIMATE AND ETHINYL ESTRADIOL 7DAYSX3 28
1 KIT ORAL DAILY
Qty: 84 TABLET | Refills: 0 | Status: SHIPPED | OUTPATIENT
Start: 2024-07-08

## 2024-07-08 RX ORDER — NORGESTIMATE AND ETHINYL ESTRADIOL 7DAYSX3 28
1 KIT ORAL DAILY
Qty: 84 TABLET | Refills: 3 | Status: SHIPPED | OUTPATIENT
Start: 2024-07-08

## 2024-07-18 ENCOUNTER — OFFICE VISIT (OUTPATIENT)
Dept: FAMILY MEDICINE CLINIC | Facility: CLINIC | Age: 44
End: 2024-07-18
Payer: COMMERCIAL

## 2024-07-18 VITALS
HEIGHT: 67 IN | TEMPERATURE: 98 F | HEART RATE: 51 BPM | WEIGHT: 157 LBS | DIASTOLIC BLOOD PRESSURE: 62 MMHG | SYSTOLIC BLOOD PRESSURE: 108 MMHG | OXYGEN SATURATION: 98 % | RESPIRATION RATE: 16 BRPM | BODY MASS INDEX: 24.64 KG/M2

## 2024-07-18 DIAGNOSIS — R39.9 URINARY SYMPTOM OR SIGN: Primary | ICD-10-CM

## 2024-07-18 DIAGNOSIS — R31.9 HEMATURIA, UNSPECIFIED TYPE: ICD-10-CM

## 2024-07-18 LAB
APPEARANCE: CLEAR
BILIRUBIN: NEGATIVE
GLUCOSE (URINE DIPSTICK): NEGATIVE MG/DL
LEUKOCYTES: NEGATIVE
MULTISTIX LOT#: ABNORMAL NUMERIC
NITRITE, URINE: NEGATIVE
PH, URINE: 5.5 (ref 4.5–8)
PROTEIN (URINE DIPSTICK): NEGATIVE MG/DL
SPECIFIC GRAVITY: 1.02 (ref 1–1.03)
URINE-COLOR: YELLOW
UROBILINOGEN,SEMI-QN: 0.2 MG/DL (ref 0–1.9)

## 2024-07-18 PROCEDURE — 3008F BODY MASS INDEX DOCD: CPT | Performed by: NURSE PRACTITIONER

## 2024-07-18 PROCEDURE — 3078F DIAST BP <80 MM HG: CPT | Performed by: NURSE PRACTITIONER

## 2024-07-18 PROCEDURE — 81003 URINALYSIS AUTO W/O SCOPE: CPT | Performed by: NURSE PRACTITIONER

## 2024-07-18 PROCEDURE — 99213 OFFICE O/P EST LOW 20 MIN: CPT | Performed by: NURSE PRACTITIONER

## 2024-07-18 PROCEDURE — 3074F SYST BP LT 130 MM HG: CPT | Performed by: NURSE PRACTITIONER

## 2024-07-18 PROCEDURE — 87086 URINE CULTURE/COLONY COUNT: CPT | Performed by: NURSE PRACTITIONER

## 2024-07-18 NOTE — PROGRESS NOTES
CHIEF COMPLAINT:     Chief Complaint   Patient presents with    Urinary Symptoms     Low abd discomfort   Denies frequency, burning          HPI:   Mariama Haines is a 43 year old female who presents for complaints of abdominal pain.  Symptoms have been present for  a few   days. Pain is located in the Periumbilical region. Pain is described as cramping. Severity is mild. The pain radiates to nowhere.  Pain is worsened by nothing specific. Gets relief of pain with nothing. Prior abdomial pain hx: none.  LMP a couple of weeks ago.  Associated symptoms: none.  Denies dysuria, urinary frequency or urgency.  No hx of kidney stones.  No vaginal discharge or concern for STI.  Denies constipation, nausea, vomiting, or fever    Current Outpatient Medications   Medication Sig Dispense Refill    Norgestim-Eth Estrad Triphasic 0.18/0.215/0.25 MG-35 MCG Oral Tab Take 1 tablet by mouth daily. 84 tablet 3    NORGESTIM-ETH ESTRAD TRIPHASIC 0.18/0.215/0.25 MG-35 MCG Oral Tab TAKE 1 TABLET BY MOUTH EVERY DAY 84 tablet 0    Ferrous Sulfate ER (SLOW FE) 142 (45 Fe) MG Oral Tab CR       FLUoxetine 10 MG Oral Cap Take 1 capsule (10 mg total) by mouth daily. 90 capsule 1    Vitamin D-Vitamin K (VITAMIN K2-VITAMIN D3 OR)       Multiple Vitamin (MULTIVITAMIN ADULT OR) Take by mouth.      PROBIOTIC PRODUCT OR Take by mouth.        Past Medical History:    Abdominal pain    Anemia    Bloating    Blood in the stool    Body piercing    Change in hair    Nails brittle    Decorative tattoo    Depression    Diarrhea, unspecified    Easy bruising    Always noticed that I bruise easier than others    Excessive bleeding    Fatigue    Flatulence/gas pain/belching    Frequent UTI    Headache disorder    Hemorrhoids    Leg swelling    Rash    eczema by left ear      Social History:  Social History     Socioeconomic History    Marital status:    Tobacco Use    Smoking status: Never    Smokeless tobacco: Never   Vaping Use    Vaping  status: Never Used   Substance and Sexual Activity    Alcohol use: Yes     Alcohol/week: 3.0 standard drinks of alcohol     Types: 3 Standard drinks or equivalent per week    Drug use: No   Other Topics Concern    Caffeine Concern No    Exercise No    Seat Belt No    Special Diet No    Stress Concern No    Weight Concern No        REVIEW OF SYSTEMS:   GENERAL HEALTH: feels well otherwise. No fever, chills, or malaise.   SKIN: denies any unusual skin lesions or rashes  HEENT: denies ear pain, congestion, or sore throat  CARDIOVASCULAR: denies chest pain or palpitations  RESPIRATORY: denies shortness of breath, cough or wheezing  GI:See HPI  NEURO: denies headaches, loss of bowel or bladder control    EXAM:   /62   Pulse 51   Temp 97.9 °F (36.6 °C)   Resp 16   Ht 5' 7\" (1.702 m)   Wt 157 lb (71.2 kg)   LMP 07/02/2024   SpO2 98%   BMI 24.59 kg/m²   GENERAL: well developed, well nourished,in no apparent distress  SKIN: no rashes,no suspicious lesions  HEAD: atraumatic, normocephalic,   EYES: conjunctiva clear, EOM intact  EARS: TM's clear, no bulging, erythema or fluid bilaterally  NOSE: nostrils patent. Nasal mucosa pink and without exudates.   THROAT: oral mucosa pink, moist. Posterior pharynx is not erythematous. No exudates.  NECK: supple,no adenopathy  LUNGS: clear to auscultation bilaterally. No wheezing, rales, or rhonchi.    CARDIO: RRR without murmur  GI/: No visible scars or masses. BS's present x4.  No palpable masses or hepatosplenomegaly.  no tenderness upon palpation in all quadrants.  No CVAT.  EXTREMITIES: no cyanosis, clubbing or edema    Recent Results (from the past 24 hour(s))   URINALYSIS, AUTO, W/O SCOPE    Collection Time: 07/18/24 11:00 AM   Result Value Ref Range    Glucose Urine Negative Negative mg/dL    Bilirubin Urine Negative Negative    Ketones, UA Trace Negative - Trace mg/dL    Spec Gravity 1.025 1.005 - 1.030    Blood Urine Small (A) Negative    PH Urine 5.5 5.0 - 8.0     Protein Urine Negative Negative - Trace mg/dL    Urobilinogen Urine 0.2 0.2 - 1.0 mg/dL    Nitrite Urine Negative Negative    Leukocyte Esterase Urine Negative Negative    APPEARANCE Clear Clear    Color Urine Yellow Yellow    Multistix Lot# [id number] Numeric    Multistix Expiration Date 4/30/25 Date         ASSESSMENT AND PLAN:     ASSESSMENT:  Mariama was seen today for urinary symptoms.    Diagnoses and all orders for this visit:    Urinary symptom or sign  -     URINALYSIS, AUTO, W/O SCOPE  -     Urine Culture, Routine [E]; Future  -     Urine Culture, Routine [E]    Hematuria, unspecified type        PLAN:  Will send urine culture and treat accordingly.  Advised to go to ER for any new or worsening symptoms.    The patient indicates understanding of these issues and agrees to the plan.

## 2024-08-02 ENCOUNTER — OFFICE VISIT (OUTPATIENT)
Dept: FAMILY MEDICINE CLINIC | Facility: CLINIC | Age: 44
End: 2024-08-02
Payer: COMMERCIAL

## 2024-08-02 VITALS
BODY MASS INDEX: 24.8 KG/M2 | HEART RATE: 63 BPM | DIASTOLIC BLOOD PRESSURE: 72 MMHG | SYSTOLIC BLOOD PRESSURE: 102 MMHG | RESPIRATION RATE: 16 BRPM | TEMPERATURE: 98 F | WEIGHT: 158 LBS | OXYGEN SATURATION: 99 % | HEIGHT: 67 IN

## 2024-08-02 DIAGNOSIS — H02.89 IRRITATION OF EYELID: Primary | ICD-10-CM

## 2024-08-02 RX ORDER — ERYTHROMYCIN 5 MG/G
1 OINTMENT OPHTHALMIC EVERY 6 HOURS
Qty: 1 EACH | Refills: 0 | Status: SHIPPED | OUTPATIENT
Start: 2024-08-02 | End: 2024-08-09

## 2024-08-02 NOTE — PROGRESS NOTES
CHIEF COMPLAINT:     Chief Complaint   Patient presents with    Eye Problem     Vision Screen R20/70 L20/70    Have a red, swollen spot under L eye. Started small yesterday but has gotten worse        HPI:   Mariama Haines is a 43 year old female who presents with chief complaint of left lower eye lid irritation. Symptoms began 1  days ago. Symptoms have been persisting and progressing since onset.   Patient reports left lower  eye lid redness, no discharge, no itching, no eyelid/lash crusting. Patient reports seeing a red \"bump\" on eyelid yesterday but this morning area is more red and \"cracked\"    Denies photophobia, pain with movement of eye, fever, cold symptoms, or contact with irritant.  Treatments tried: none    Current Outpatient Medications   Medication Sig Dispense Refill    erythromycin 5 MG/GM Ophthalmic Ointment Place 1 Application into the left eye every 6 (six) hours for 7 days. 1 each 0    Norgestim-Eth Estrad Triphasic 0.18/0.215/0.25 MG-35 MCG Oral Tab Take 1 tablet by mouth daily. 84 tablet 3    NORGESTIM-ETH ESTRAD TRIPHASIC 0.18/0.215/0.25 MG-35 MCG Oral Tab TAKE 1 TABLET BY MOUTH EVERY DAY 84 tablet 0    Ferrous Sulfate ER (SLOW FE) 142 (45 Fe) MG Oral Tab CR       FLUoxetine 10 MG Oral Cap Take 1 capsule (10 mg total) by mouth daily. 90 capsule 1    Vitamin D-Vitamin K (VITAMIN K2-VITAMIN D3 OR)       Multiple Vitamin (MULTIVITAMIN ADULT OR) Take by mouth.      PROBIOTIC PRODUCT OR Take by mouth.        Past Medical History:    Abdominal pain    Anemia    Bloating    Blood in the stool    Body piercing    Change in hair    Nails brittle    Decorative tattoo    Depression    Diarrhea, unspecified    Easy bruising    Always noticed that I bruise easier than others    Excessive bleeding    Fatigue    Flatulence/gas pain/belching    Frequent UTI    Headache disorder    Hemorrhoids    Leg swelling    Rash    eczema by left ear      Past Surgical History:   Procedure Laterality Date     Colonoscopy  May 2016      Family History   Problem Relation Age of Onset    Diabetes Maternal Grandmother     Stroke Maternal Grandfather       Social History     Socioeconomic History    Marital status:    Tobacco Use    Smoking status: Never    Smokeless tobacco: Never   Vaping Use    Vaping status: Never Used   Substance and Sexual Activity    Alcohol use: Yes     Alcohol/week: 3.0 standard drinks of alcohol     Types: 3 Standard drinks or equivalent per week    Drug use: No   Other Topics Concern    Caffeine Concern No    Exercise No    Seat Belt No    Special Diet No    Stress Concern No    Weight Concern No         REVIEW OF SYSTEMS:   GENERAL: feels well otherwise  SKIN: no rashes  EYES:  See HPI  HENT: denies ear pain, congestion, sore throat  LUNGS: denies shortness of breath or cough  CARDIOVASCULAR: denies chest pain or palpitations   GI: denies N/V/C or abdominal pain    EXAM:   /72   Pulse 63   Temp 97.8 °F (36.6 °C)   Resp 16   Ht 5' 7\" (1.702 m)   Wt 158 lb (71.7 kg)   LMP 07/23/2024   SpO2 99%   BMI 24.75 kg/m²   Visual Acuity     Vision Screen Test Type: Snellen Wall Chart    Right Eye Visual Acuity: Uncorrected Right Eye Chart Acuity: 20/70   Left Eye Visual Acuity: Uncorrected Left Eye Chart Acuity: 20/70             GENERAL: well developed, well nourished,in no apparent distress  SKIN: no rashes,no suspicious lesions  EYES: PERRLA, EOMI, bilateral conjunctiva non-erythematous, not injected, no discharge. Left lower inner canthus with a scaly, red, eczematous in appearance, no vesicles, with bruise-like discoloration extending under the eye.   HENT: atraumatic, normocephalic, TMs non injected, without bulging or fluid bilaterally. Nasal mucosa pink and non inflamed. Posterior pharynx pink without lesions.   NECK: supple, non tender  LUNGS: clear to auscultation bilaterally.   CARDIO: RRR without murmur  LYMPH: No preauricular lymphadenopathy. No cervical  lymphadenopathy    ASSESSMENT AND PLAN:   Mariama Haines is a 43 year old female who presents with:    ASSESSMENT:   Encounter Diagnosis   Name Primary?    Irritation of eyelid Yes       PLAN: Medication as listed below.  Hygeine and comfort care as listed below and in patient instructions.  Advised patient to avoid touching eyes.  Warm compresses to affected eye prn.       Requested Prescriptions     Signed Prescriptions Disp Refills    erythromycin 5 MG/GM Ophthalmic Ointment 1 each 0     Sig: Place 1 Application into the left eye every 6 (six) hours for 7 days.       Risks, benefits, complications and side effects of meds discussed.    Monitor for worsening and development of vesicles.    See PCP or ophthalmologist if not improved in 1-2 days.

## 2024-09-30 RX ORDER — NORGESTIMATE AND ETHINYL ESTRADIOL 7DAYSX3 28
1 KIT ORAL DAILY
Qty: 84 TABLET | Refills: 0 | OUTPATIENT
Start: 2024-09-30

## 2024-09-30 NOTE — TELEPHONE ENCOUNTER
Medication(s) to Refill:   Requested Prescriptions     Pending Prescriptions Disp Refills    NORGESTIM-ETH ESTRAD TRIPHASIC 0.18/0.215/0.25 MG-35 MCG Oral Tab [Pharmacy Med Name: [facility] 0.18-0.215-0.25/0.035] 84 tablet 0     Sig: TAKE 1 TABLET BY MOUTH EVERY DAY         Reason for Medication Refill being sent to Provider / Reason Protocol Failed:  [] 90 day refill has already been granted  [] Blood Pressure out of range  [] Labs Abnormal/over due  [] Medication not previously prescribed by Provider  [] Non-Protocol Medication  [] Controlled Substance   [] Due for appointment- no future appointment scheduled  [] No Follow up specified      Last Time Medication was Filled:        Last Office Visit with PCP:     When Patient was Due Back to the Office:    (from when PCP last addressed condition)    Future Appointments:  No future appointments.      Last Blood Pressures:  BP Readings from Last 2 Encounters:   08/02/24 102/72   07/18/24 108/62         Recent Labs:        Action taken:  [] Refill approved per protocol  [] Routing to provider for approval

## 2024-10-14 ENCOUNTER — ORDER TRANSCRIPTION (OUTPATIENT)
Dept: PHYSICAL THERAPY | Facility: HOSPITAL | Age: 44
End: 2024-10-14

## 2024-10-14 DIAGNOSIS — M62.89 HIGH-TONE PELVIC FLOOR DYSFUNCTION IN FEMALE: Primary | ICD-10-CM

## 2024-11-14 ENCOUNTER — TELEPHONE (OUTPATIENT)
Dept: PHYSICAL THERAPY | Facility: HOSPITAL | Age: 44
End: 2024-11-14

## 2024-11-15 ENCOUNTER — OFFICE VISIT (OUTPATIENT)
Dept: PHYSICAL THERAPY | Age: 44
End: 2024-11-15
Payer: COMMERCIAL

## 2024-11-15 DIAGNOSIS — M62.89 HIGH-TONE PELVIC FLOOR DYSFUNCTION IN FEMALE: Primary | ICD-10-CM

## 2024-11-15 PROCEDURE — 97112 NEUROMUSCULAR REEDUCATION: CPT | Performed by: PHYSICAL THERAPIST

## 2024-11-15 PROCEDURE — 97162 PT EVAL MOD COMPLEX 30 MIN: CPT | Performed by: PHYSICAL THERAPIST

## 2024-11-15 NOTE — PATIENT INSTRUCTIONS
Belly Hard Belly Big with Squatty Potty    Put feet on step stool to increase anorectal angle.  Breathe from Diaphragm, hold intraabdominal pressure (20 seconds) to push feces into rectum without straining. Then open mouth exhale then tilt pelvis back/ forth to have BM.

## 2024-11-15 NOTE — PROGRESS NOTES
MUSCULOSKELETAL AND PELVIC FLOOR EVALUATION:     Diagnosis:   High-tone pelvic floor dysfunction in female (M62.89)      Referring Provider: Physician Nonstaff  Date of Evaluation:    11/15/2024    Precautions:  None Next MD visit:   none scheduled  Date of Surgery: n/a     PATIENT SUMMARY   Mariama Haines is a 44 year old female who presents to therapy today with complaints of dyspareunia, rectal discomfort & pain primarily with sitting, increased duration to empty bowels with defecation; abdominal fullness/ heaviness with hx anal fissures & both internal/ external hemorrhoids. First colonoscopy/ endoscopy  WNL, second in  WNL. Some chronic bleeding. Finally saw the correct provider to try to get to the route of her issue.    Pt describes pain level: with intercourse worst 8-9/10; with sitting 8/10. Low back tightness/ soreness more recently, L knee swollen/ achy (no EDGAR). No pelvic/ abdominal pain.    Pregnant Now: No  Obstetrical/Gynecological history: ; light menses OCP (no significant pain)    Urodynamic Test: no  Manometry: no    Occupation/Activities:  (sitting)    PFDI-20: 84.38/300; Impairment= 28.126 %  PFDI 20    Scores   POPDI 6:    29.17   CRAD 8:    34.38   LOUANN 6:    20.83   Summary:    84.38      Mariama describes prior level of function: symptoms ongoing for many years. Pt goals include \"to get rid of the discomfort & to have any bleeding stop, have intercourse more enjoyable.\"  Past medical history was reviewed with Mariama. Significant findings include  has a past medical history of Abdominal pain (2021), Anemia ( / 2021), Bloating (2021), Blood in the stool (2021), Body piercing (20ish years ago), Change in hair (2021), Decorative tattoo (20ish years ago), Depression, Diarrhea, unspecified (Occasional over the years), Easy bruising (Years), Excessive bleeding (2021), Fatigue (2021), Flatulence/gas pain/belching  (Jan 2021), Frequent UTI (Have a history but none for years), Headache disorder (Jan 2021), Hemorrhoids (Years ago), Leg swelling (Occasionally for years), and Rash (2 years ago).    URINARY HABITS  Types of symptoms: other: none    Abdominal/Vaginal Pressure complaints: no - fullness/ heaviness to abdomen; no bulge  Urinary Frequency: feels WNL  Leaking occurs: no  Nocturia: no  Fluid Intake: aims for 64 oz  Hovering: only in public    BOWEL HABITS  Types of symptoms: Constipation; needs to sit a while to empty. Can sit for 20 min  Frequency of bowel movements: multiple times/ day: usually 2x  Stool consistency: Chaves Stool Scale: 4-5  Do you strain with defecation: Yes - occasional  Laxative use: No  Pescatarian    SEXUAL HEALTH STATUS  Marinoff Scale: 2  Sexual [place] Status: active  Pain with initial and/or deep penetration: deeper, positional (not sure if having a few episodes of leakage)  Pain with pelvic exam/tampon use: no/no    ASSESSMENT  Mariama presents to physical therapy evaluation with primary c/o dyspareunia, rectal discomfort & pain primarily with sitting, increased duration to empty bowels with defecation; abdominal fullness/ heaviness with hx anal fissures & both internal/ external hemorrhoids. The results of the objective tests and measures show external impairments in posture, soft tissue mobility/ abdominal wall integrity, hip ROM, hip strength, LE flexibility, breathing mechanics, abdominal bracing with additional objective measures including internal PFM exam to be completed at subsequent session with pt's ongoing informed consent. Functional deficits include but are not limited to constipation with increased duration required for bowel emptying (up to 20 min); occasional straining; pain with deeper penetration with intercourse including positional (Marinoff 2); pain/ discomfort with sitting. Signs and symptoms are consistent with diagnosis of High-tone pelvic floor dysfunction in  female (M62.89). Pt and PT discussed evaluation findings, pathology, POC and HEP. Pt voiced understanding and performs HEP correctly without reported pain. Skilled Pelvic Physical Therapy is medically necessary to address the above impairments and reach functional goals.    OBJECTIVE:   Consent obtained with all palpation listed below.    Posture: mild reduced lumbar lordotic curve; mild B rib flare    External Palpation: pain-free STRs to B adductors, B glutes/ piriformis; B external OI (prone)    Abdominal Wall Integrity: increased tension throughout with mod reduced fascial glide all planes    Range Of Motion:  Lumbar AROM screen: defer  LE PROM screen: ER: R mod restricted, L min restricted; IR: R min restricted, L mod restricted    Strength (MMT):  Hip Abduction: R 4/5, L 4+/5  Hip Extension: R 4/5, L 4/5    Flexibility Summary: HS L min-mod restricted, R min restricted; piriformis B min restricted    Breathing Mechanics: slightly more shallow breathing pattern with overall reduced 360 expansion all planes    Abdominal Bracing: doming with upper ab gripping    Informed consent for internal pelvic evaluation given: Yes, deferred to subsequent visit.    Today's Treatment and Response:   Patient education was provided on objective findings of external evaluation and expectations with treatment outcomes. Educated on pelvic anatomy and function with diagrams and pelvic model and proper toileting posture. Advised to avoid sitting > 8 min while having BM to avoid strain/ stress to delicate rectal tissue (to avoid additional stress on fissures/ hemorrhoids)    Patient was instructed in and issued a HEP for: belly hard belly big with squatty potty/ stepstool; colon massage (ILU massage)    Charges: PT Eval Moderate Complexity, Neuro X 1      Total Timed Treatment: 12 min     Total Treatment Time: 40 min     Based on clinical rationale and outcome measures, this evaluation involved Moderate Complexity decision making due  to 1-2 personal factors/comorbidities, 4+ body structures involved/activity limitations, and evolving symptoms including  constipation, dyspareunia  PLAN OF CARE:    Goals: (to be met in 10 visits)  Patient will report consumption of at least 25 grams of daily fiber and 64 ounces of water for improved stool consistency.  Patient will demonstrate optimal voiding mechanics and breath regulation with bowel movements for improved bowel emptying without straining, for reduced risk of POP, with avoiding sitting > 8' without active BM.  Patient will report Marinoff Scale 0-1 & will demonstrate PFM lengthening WNL with coordinated diaphragmatic breathing x 10 reps to alleviate pain and muscle tension during intercourse.  Patient will report pain at worst 4/10 with prolonged sitting during work day.  Patient will demonstrate B HS & piriformis muscle length WNL to alleviate tension in support structures of pelvic floor musculature.  Patient will report adherence to HEP for continued exercise benefits following cessation of PT.    Frequency / Duration: Patient will be seen for 1-2 x/week or a total of 10 visits over a 90 day period.  Treatment will include: Manual Therapy, Neuromuscular Re-education, Self-Care Home Management, Therapeutic Activities, Therapeutic Exercise, Home Exercise Program instruction, and Modalities to include: Electrical stimulation (unattended) and Ultrasound     Education or treatment limitation: None  Rehab Potential:excellent    Patient/Family/Caregiver was advised of these findings, precautions, and treatment options and has agreed to actively participate in planning and for this course of care.    Thank you for your referral. Please co-sign or sign and return this letter via fax as soon as possible to [phone]. If you have any questions, please contact me at Dept: [phone]    Sincerely,  Electronically signed by therapist: Tawana Mcmahon, PT    Physician's certification required: Yes  I certify  the need for these services furnished under this plan of treatment and while under my care.    X___________________________________________________ Date____________________    Certification From: 11/15/2024  To:2/13/2025

## 2024-11-22 ENCOUNTER — OFFICE VISIT (OUTPATIENT)
Dept: PHYSICAL THERAPY | Age: 44
End: 2024-11-22
Payer: COMMERCIAL

## 2024-11-22 PROCEDURE — 97140 MANUAL THERAPY 1/> REGIONS: CPT | Performed by: PHYSICAL THERAPIST

## 2024-11-22 PROCEDURE — 97112 NEUROMUSCULAR REEDUCATION: CPT | Performed by: PHYSICAL THERAPIST

## 2024-11-22 PROCEDURE — 97535 SELF CARE MNGMENT TRAINING: CPT | Performed by: PHYSICAL THERAPIST

## 2024-11-22 NOTE — PATIENT INSTRUCTIONS
ABOUT CONSTIPATION    WHAT IS CONSTIPATION?  Constipation is defined as infrequent (fewer than three) bowel movements per week. About 80% of people experience constipation during their lifetime and brief periods of constipation is normal.  Common symptoms can include:  Decrease in amount of stool  Need to strain to have a bowel movement (BM)  Sense of incomplete emptying  Need for enemas, suppositories and/or laxatives in order to maintain regularity    Any persistent change in bowel habit, such as an increase or decrease in frequency or size of stool, blood in stool, or an increased difficulty in evacuating, warrants a medical consultation.    WHAT ARE NORMAL BOWEL HABITS?  For most people, it is normal for bowel movements to occur from 3 times per day to 3 times per week. Some people can go for a week without experiencing discomfort or harmful effects. Normal stools should be about the size, shape and consistency of a ripe banana.    WHAT CAUSES CONSTIPATION?  Constipation may be the result of several, possibly simultaneous factors including:   Limited fluid and fiber intake  Imbalances in the diet (too much sugar and animal fat)  Sedentary lifestyle  Repeatedly ignoring the urge to have a BM  Slow movement of the stool - too much water absorption in the colon  Lifestyle changes, such as pregnancy and travel  Laxative abuse    CAN MEDICATIONS CAUSE CONSTIPATION?  Yes, constipation can be caused by medications you are taking for other conditions. Common medications include; pain medicines, antidepressants, psychiatric medications, high blood pressure medication, diuretics, iron supplements, calcium supplements, tranquilizers and antacids containing aluminum. Changes in bowel habits should always be reported to your physician. If you have a history of constipation or have recently become constipated discuss this with your physician.    HOW DOES CONSTIPATION AFFECT THE BLADDER?  Constipation is another possible cause  of bladder control problems.  When the rectum is full of stool, it may disturb the bladder. Chronic constipation and/or straining can lead to excessive stress on pelvic organs and nerves. This condition also contributes to bladder dysfunction.    HOW IS CONSTIPATION TREATED?  Most people in Western society need more fiber in their diet. Fiber supplements or other bulking agents sold at drug stores are available. Fiber supplements take several weeks, possibly months, to reach full effectiveness, but they are not habit forming or harmful as some laxatives can be with overuse or abuse.  It is important to avoid regular use of laxatives and enemas as they decrease the ability of the bowel to function.  You should discuss your fiber needs with your physician, pharmacist or nutritionist.  Typical dietary recommendations for fiber are between 25-35 grams per day. Most Americans consume only 10-15 grams per day.  When adding fiber to your diet it is important to remember to drink plenty of fluids at least 6-8 cups per day.    Your body has a natural emptying reflex. Approximately ½ hour after eating a meal or drinking a hot beverage, a reflex occurs to increase motility or movement of the stool down to the rectum. This reflex usually occurs in the mornings when trying to get yourself or your family ready to get out the door. Try getting up earlier to eat breakfast and allow time to take advantage of this reflex.    It is also helpful to properly position yourself on the toilet to allow for maximal relaxation of your pelvic floor muscles. Be sure your feet are supported or use a stool to obtain maximal hip and knee flexion, similar to a squat position. Leaning forward and supporting your elbows on your knees is also beneficial. Pay attention to the relaxation of your pelvic floor muscles while emptying your bowels. Be sure to take time to empty your bowels. Remember the word “rest” in restroom. Exercising on a regular basis  is also helpful to stimulate a sluggish bowel.    This recipe is commonly suggested to promote regular bowel function by increasing dietary fiber.  You may experience a bloated feeling and have gas when adding fiber to your diet but this should pass within a few weeks. This may be eased by adding fiber slowly to your diet.    RECIPE FOR BOWEL REGULARITY *  Mix together:   1 cup applesauce  1 cup unprocessed wheat bran or oat bran  3/4 cup prune juice    Begin with 1-2 Tbs. each evening mixed with or followed by one 6-8 oz cup of water or juice.  This should help to soften and regulate your bowel movements within 2 weeks. If no change occurs, slowly increase serving to 3-4 Tbs.    This may be stored in your refrigerator or your freezer. One to two tablespoon servings may be frozen in sectioned ice cube trays or in foam plastic egg cartons and thawed as needed.   * 1 tablespoon is approximately 2 grams of fiber with wheat bran & 1 gram with oat bran.              FIBER FACTS    To maintain proper bowel function high fiber foods, such as bran, shredded wheat, whole grain breads, whole fresh fruits especially those with skins such as apples, raw vegetables, are important in your diet. Fiber helps general bowel health whether you lose stool or have constipation. It works by bulking and softening the stool and therefore making it easier to pass.    When adding fiber into your diet, do so gradually to prevent gas and bloating. This can occur if your body is not used to digesting fiber in large amounts. It is also important to drink at least 6-8 glasses of water daily to keep the fiber moving through your system.  Below are some tips to help:  Choose fresh fruits or vegetables rather than juice.   Eat the skin of cleaned fruits and vegetables.   Choose bran and whole grain breads/cereals daily.   An increase in fiber should be accompanied with an increase in water.   Eat less processed foods and more fresh foods.   A good  source of dietary fiber contains at least 2 grams per serving. Be sure to eat foods that contain both soluble   Insoluble fiber includes foods that are not easily mashable    Fruits  Vegetables  Dried beans  Wheat bran  Seeds  Popcorn  Brown rice  Whole grain products such as breads, cereals and pasta    Soluble fiber includes foods that are smooth and low residue  Fruits such as apples, oranges, pears, peaches  Vegetables  Seeds  Oat bran  Dried beans  Oatmeal, barley and rye  Prunes    FOOD PREPARATION  Dietary fiber can be reduced in foods during preparation and cooking. To retain fiber, serve fresh fruits and vegetables. When preparing food leave edible skins and seeds, and use whole-grain flours.      Grams of Fiber in Food Products *  Food Products Serving Size Grams of Fiber per serving   Breads     Whole Wheat 1 slice 2.11   White 1 slice 0.50   Rye 1 slice 1.72   Cereals     Oat Bran 1 oz. 4.06   Wheat Bran 1 oz. 10.0   All Bran ½ cup 6.0   Optimum 1 cup 10.0   Whole Wheat Total 1 cup 3.0   Fiber One  ½ cup 13.0   Shredded Wheat 1oz. 2.64   Corn Flakes 1 oz. 0.45   Cheerio's 11/3 cup 2.0   Oatmeal 1 oz. 2.5   Rice     Brown ½ cup 5.27   White ½ cup 1.42   Spaghetti 2 oz. 2.56   Vegetables (cooked)     Broccoli ½ cup 2.58   [place] sprouts ½ cup 2.0   Cauliflower ½ cup 2.6   Carrots ½ cup 3.2   Corn ½ cup 3.03   Eggplant ½ cup 0.96   Green peas ½ cup 3.36   Lettuce (raw) ½ cup 0.24   Baked potato w/skin ½ cup 2.97   Spinach ½ cup 2.07   Squash ½ cup 2.87   Tomato (raw) ½ cup 1.17   Zucchini ½ cup 1.26   Beans     Green (canned) ½ cup 1.89   Kidney ½ cup 5.48   Lima ½ cup 4.25   [place] ½ cup 5.93   Fresh fruits     Apple (with peel) 1 medium 2.76   Apricots 1 cup 3.13   Banana  1 medium 2.19   Blackberries 1 cup 7.20   Boysenberries 1 cup 7.20   Grapefruit 1 medium 3.61   Grapes 1 cup 1.12   Nectarine 1 medium 2.2   Orange 1 medium 3.14   Pear (with peel) 1 medium 4.32   Prunes 3 3.5   Raspberries 1 cup 7.50    Strawberries 1 cup 3.87   Watermelon 1 slice 1.93   *For additional information on fiber content in foods go to www.[employer].com   *Read Nutritional Facts labels on the foods you buy. Product may vary in fiber content        Access Code: [email or web address]  URL: [email or web address]  Date: 11/22/2024  Prepared by: Tawana Mcmahon    Exercises  - Pelvic Floor Lengthening in Hooklying  - 1 x daily - 7 x weekly - 2-3 min duration  - Happy Baby with Pelvic Floor Lengthening  - 1 x daily - 7 x weekly - 2-3 min hold  - Seated Pelvic Floor Lengthening  - 1 x daily - 7 x weekly - 2-3 min duration  - Supine Diaphragmatic Breathing with Pelvic Floor Lengthening  - 1 x daily - 7 x weekly - 2-3 min duration

## 2024-11-22 NOTE — PROGRESS NOTES
Diagnosis:   High-tone pelvic floor dysfunction in female (M62.89)        Referring Provider: Physician Nonstaff  Date of Evaluation:    11/15/2024    Precautions:  None Next MD visit:   none scheduled  Date of Surgery: n/a   Insurance Primary/Secondary: [employer] OUT OF STATE / N/A     # Auth Visits: 20 visit limit            Subjective: No significant changes. Trying to avoid prolonged sitting on the toilet. Cont to take fiber (psyllium husk)    Pain: 5/10 rectal discomfort      Objective: See Flowsheet for details  11/22/2024  Informed verbal consent for internal pelvic evaluation given: Yes, consent obtained with frequent check-ins throughout. Advise pain levels should be controlled. Education: pt to inform PT if requesting break/ stop for any reason at any time.    External Observation:   Voluntary contraction: present   Voluntary relaxation: present  Involuntary contraction: present  Involuntary relaxation: present - partial    Mons pubis: WNL  Labia majora: WNL  Labia minora: WNL  Urethral meatus: WNL  Introitus: WNL  Perineal body: WNL  +external hemorrhoids    Sensory/Reflex:  Vestibule: normal bilaterally    Internal Examination  Pelvic Floor Muscle strength: (PERF= Power/Endurance/Reps/Fast) MMT: 4-5/10/NT/NT  Accessory Muscle Use: gluteals    Tissue Laxity Test:  Anterior Wall: WNL  Posterior Wall: WNL  Apical: WNL    Internal Palpation: WNL except Pubococcygeus/Pubovaginalis B minimal restriction  Iliococcygeus B minimal restriction  Coccygeus B minimal restriction  Obturator Internus B minimal restriction  ^No pain to PFM      Assessment: Pt opted to proceed with internal PFM exam. While no TTP to PFM pt had minimal restriction to B layer 3 PFM, addressed with MFR without increased pain. Provided with cueing concurrent with internal palpation for promoting increased excursion of PFM lengthening due to only partial excursion noted.      Goals: (to be met in 10 visits)  Patient will report consumption of at  least 25 grams of daily fiber and 64 ounces of water for improved stool consistency.  Patient will demonstrate optimal voiding mechanics and breath regulation with bowel movements for improved bowel emptying without straining, for reduced risk of POP, with avoiding sitting > 8' without active BM.  Patient will report Marinoff Scale 0-1 & will demonstrate PFM lengthening WNL with coordinated diaphragmatic breathing x 10 reps to alleviate pain and muscle tension during intercourse.  Patient will report pain at worst 4/10 with prolonged sitting during work day.  Patient will demonstrate B HS & piriformis muscle length WNL to alleviate tension in support structures of pelvic floor musculature.  Patient will report adherence to HEP for continued exercise benefits following cessation of PT.    Plan: Next visit: addition of PFM stretches/ openers to improve PFM mobility as well as LE stretches/ mobility work for improved bowel emptying & for reduced dyspareunia.  Date: 11/22/2024  TX#: 2/10 Date:                 TX#: 3/ Date:                 TX#: 4/ Date:                 TX#: 5/ Date:   TX#: 6/   Neuro Re-ed: 20'  Coccyx cushion: reduced rectal pain with sitting  With internal manual cueing: PFM lengthening with breathing coordination with use of analogies (including elevator)  -HEP: hooklying, seated; with Diaphragmatic Breathing       Manual Therapy: 20'  Internal PFM exam: see above for details.  -Explain mechanism of exam using pelvic model & clinical benefit/ purpose of exam. Discuss findings with pt including connection to POC.  -Aftercare: potential of mild soreness.  -MFR to layer 3 PFM B       Self-Care: 10'  About constipation, fiber facts - handouts provided. Discussed water intake relevant to fiber. Disclaimer PT is not a dietician & the above info is general education only.       HEP: IE: belly hard belly big with squatty potty/ stepstool; colon massage (ILU massage); avoid sitting > 8 min while having    Access Code: [email or web address]  URL: [email or web address]  Date: 11/22/2024  Prepared by: Tawana Mcmahon    Exercises  - Pelvic Floor Lengthening in Hooklying  - 1 x daily - 7 x weekly - 2-3 min duration  - Happy Baby with Pelvic Floor Lengthening  - 1 x daily - 7 x weekly - 2-3 min hold  - Seated Pelvic Floor Lengthening  - 1 x daily - 7 x weekly - 2-3 min duration  - Supine Diaphragmatic Breathing with Pelvic Floor Lengthening  - 1 x daily - 7 x weekly - 2-3 min duration    Charges: Neuro X 1, Manual X 1, Self-Care X 1       Total Timed Treatment: 50 min  Total Treatment Time: 50 min

## 2024-11-27 ENCOUNTER — OFFICE VISIT (OUTPATIENT)
Dept: PHYSICAL THERAPY | Age: 44
End: 2024-11-27
Payer: COMMERCIAL

## 2024-11-27 PROCEDURE — 97110 THERAPEUTIC EXERCISES: CPT | Performed by: PHYSICAL THERAPIST

## 2024-11-27 NOTE — PATIENT INSTRUCTIONS
Access Code: [email or web address]  URL: [email or web address]  Date: 11/27/2024  Prepared by: Tawana Mcmahon    Exercises  - Supine Butterfly Groin Stretch  - 1 x daily - 7 x weekly - 3 sets - 30 sec hold  - Hooklying Active Hamstring Stretch  - 1 x daily - 7 x weekly - 1 sets - 10 reps - 10 sec hold  - Seated Figure 4 Piriformis Stretch  - 1 x daily - 7 x weekly - 3 sets - 30 sec hold  - Supine Figure 4 Piriformis Stretch  - 1 x daily - 7 x weekly - 3 sets - 30 sec hold  - Cat Cow  - 1 x daily - 7 x weekly - 2 sets - 10 reps - 2-3 sec hold  - Child's Pose Knees Apart and Hands Forward   - 1 x daily - 7 x weekly - 3 sets - 30 sec hold  - Tail Wag  - 1 x daily - 7 x weekly - 2 sets - 10 reps - 2-3 sec hold  - Sidelying Open Book Thoracic Lumbar Rotation and Extension  - 1 x daily - 7 x weekly - 3 sets - 10 reps - 5 sec hold  - Quadruped Full Range Thoracic Rotation with Reach  - 1 x daily - 7 x weekly - 1 sets - 10 reps - 5 sec hold  - Standing 'L' Stretch at Counter  - 1 x daily - 7 x weekly - 3 sets - 30 sec hold  - Kneeling Adductor Stretch with Hip External Rotation  - 1 x daily - 7 x weekly - 3 sets - 30 sec hold  - Deep Squat with Pelvic Floor Relaxation  - 1 x daily - 7 x weekly - 2-3 min duration

## 2024-11-27 NOTE — PROGRESS NOTES
Diagnosis:   High-tone pelvic floor dysfunction in female (M62.89)        Referring Provider: Physician Nonstaff  Date of Evaluation:    11/15/2024    Precautions:  None Next MD visit:   none scheduled  Date of Surgery: n/a   Insurance Primary/Secondary: BCBS OUT OF STATE / N/A     # Auth Visits: 20 visit limit            Subjective: Pt reports no lingering discomfort after last visit; reports some pain up to 9-10 after morning BM, using sitz bath to manage. Been getting some spotting though nothing significant. Completed the exercises    Pain: 5/10 rectal discomfort      Objective: See Flowsheet for details  11/22/2024  Informed verbal consent for internal pelvic evaluation given: Yes, consent obtained with frequent check-ins throughout. Advise pain levels should be controlled. Education: pt to inform PT if requesting break/ stop for any reason at any time.    External Observation:   Voluntary contraction: present   Voluntary relaxation: present  Involuntary contraction: present  Involuntary relaxation: present - partial    Mons pubis: WNL  Labia majora: WNL  Labia minora: WNL  Urethral meatus: WNL  Introitus: WNL  Perineal body: WNL  +external hemorrhoids    Sensory/Reflex:  Vestibule: normal bilaterally    Internal Examination  Pelvic Floor Muscle strength: (PERF= Power/Endurance/Reps/Fast) MMT: 4-5/10/NT/NT  Accessory Muscle Use: gluteals    Tissue Laxity Test:  Anterior Wall: WNL  Posterior Wall: WNL  Apical: WNL    Internal Palpation: WNL except Pubococcygeus/Pubovaginalis B minimal restriction  Iliococcygeus B minimal restriction  Coccygeus B minimal restriction  Obturator Internus B minimal restriction  ^No pain to PFM      Assessment: Emphasis on PFM mobility/ stretches \"openers\" to promote downtraining to incorporate for HEP. Pt had overall excellent tolerance without any pain.      Goals: (to be met in 10 visits)  Patient will report consumption of at least 25 grams of daily fiber and 64 ounces of water  for improved stool consistency.  Patient will demonstrate optimal voiding mechanics and breath regulation with bowel movements for improved bowel emptying without straining, for reduced risk of POP, with avoiding sitting > 8' without active BM.  Patient will report Marinoff Scale 0-1 & will demonstrate PFM lengthening WNL with coordinated diaphragmatic breathing x 10 reps to alleviate pain and muscle tension during intercourse.  Patient will report pain at worst 4/10 with prolonged sitting during work day.  Patient will demonstrate B HS & piriformis muscle length WNL to alleviate tension in support structures of pelvic floor musculature.  Patient will report adherence to HEP for continued exercise benefits following cessation of PT.    Plan: Next visit: cont with prox hip strengthening & abdominal bracing; may re-assess internally with pt's informed consent for MFR for any STRs & for ensuring sufficient PFM lengthening  Date: 11/22/2024  TX#: 2/10 Date: 11/27/2024  TX#: 3/10 Date:                 TX#: 4/ Date:                 TX#: 5/ Date:   TX#: 6/   Neuro Re-ed: 20'  Coccyx cushion: reduced rectal pain with sitting  With internal manual cueing: PFM lengthening with breathing coordination with use of analogies (including elevator)  -HEP: hooklying, seated; with Diaphragmatic Breathing       Manual Therapy: 20'  Internal PFM exam: see above for details.  -Explain mechanism of exam using pelvic model & clinical benefit/ purpose of exam. Discuss findings with pt including connection to POC.  -Aftercare: potential of mild soreness.  -MFR to layer 3 PFM B       Self-Care: 10'  About constipation, fiber facts - handouts provided. Discussed water intake relevant to fiber. Disclaimer PT is not a dietician & the above info is general education only.        Therex: 42'  Hooklying hip Abd Annelise with Blue TB 10\" X 10  -with trial of bridge progression  Supine butterfly stretch 3 X 30\"  Active hooklying HS stretch 10\" X 5  ea  Figure-4 piriformis stretch 3 X 30\" ea  Cat-cow with breathing X 20  Child's pose stretch 3 X 30\"  Tail wags X 20 total  Thread the needle stretch with opp reach across 5\" X 5 ea  Open the book stretch 5\" X 5 ea  Flat back stretch 3 X 30\" ea  Standing hip Add stretch 2 X 30\" ea  Deep squat stretch at wall X 2'  ^HEP update      HEP: IE: belly hard belly big with squatty potty/ stepstool; colon massage (ILU massage); avoid sitting > 8 min while having BM  Access Code: [email or web address]  URL: [email or web address]  Date: 11/22/2024  Prepared by: Tawana Mcmahon    Exercises  - Pelvic Floor Lengthening in Hooklying  - 1 x daily - 7 x weekly - 2-3 min duration  - Happy Baby with Pelvic Floor Lengthening  - 1 x daily - 7 x weekly - 2-3 min hold  - Seated Pelvic Floor Lengthening  - 1 x daily - 7 x weekly - 2-3 min duration  - Supine Diaphragmatic Breathing with Pelvic Floor Lengthening  - 1 x daily - 7 x weekly - 2-3 min duration    Access Code: [email or web address]  URL: [email or web address]  Date: 11/27/2024  Prepared by: Tawana Mcmahon    Exercises  - Supine Butterfly Groin Stretch  - 1 x daily - 7 x weekly - 3 sets - 30 sec hold  - Hooklying Active Hamstring Stretch  - 1 x daily - 7 x weekly - 1 sets - 10 reps - 10 sec hold  - Seated Figure 4 Piriformis Stretch  - 1 x daily - 7 x weekly - 3 sets - 30 sec hold  - Supine Figure 4 Piriformis Stretch  - 1 x daily - 7 x weekly - 3 sets - 30 sec hold  - Cat Cow  - 1 x daily - 7 x weekly - 2 sets - 10 reps - 2-3 sec hold  - Child's Pose Knees Apart and Hands Forward   - 1 x daily - 7 x weekly - 3 sets - 30 sec hold  - Tail Wag  - 1 x daily - 7 x weekly - 2 sets - 10 reps - 2-3 sec hold  - Sidelying Open Book Thoracic Lumbar Rotation and Extension  - 1 x daily - 7 x weekly - 3 sets - 10 reps - 5 sec hold  - Quadruped Full Range Thoracic Rotation with Reach  - 1 x daily - 7 x weekly - 1 sets - 10 reps - 5 sec hold  - Standing 'L' Stretch at Counter  - 1 x daily -  7 x weekly - 3 sets - 30 sec hold  - Kneeling Adductor Stretch with Hip External Rotation  - 1 x daily - 7 x weekly - 3 sets - 30 sec hold  - Deep Squat with Pelvic Floor Relaxation  - 1 x daily - 7 x weekly - 2-3 min duration    Charges: Therex X 3       Total Timed Treatment: 42 min  Total Treatment Time: 42 min

## 2024-12-06 ENCOUNTER — OFFICE VISIT (OUTPATIENT)
Dept: PHYSICAL THERAPY | Age: 44
End: 2024-12-06
Payer: COMMERCIAL

## 2024-12-06 PROCEDURE — 97110 THERAPEUTIC EXERCISES: CPT | Performed by: PHYSICAL THERAPIST

## 2024-12-06 PROCEDURE — 97140 MANUAL THERAPY 1/> REGIONS: CPT | Performed by: PHYSICAL THERAPIST

## 2024-12-06 NOTE — PROGRESS NOTES
Diagnosis:   High-tone pelvic floor dysfunction in female (M62.89)        Referring Provider: Physician Nonstaff  Date of Evaluation:    11/15/2024    Precautions:  None Next MD visit:   none scheduled  Date of Surgery: n/a   Insurance Primary/Secondary: BCBS OUT OF STATE / N/A     # Auth Visits: 20 visit limit            Subjective: Pt reports reduced pain. Some low back stiffness    Pain: 3/10 rectal discomfort      Objective: See Flowsheet for details  12/6/2024  Lumbar AROM: Flex WNL, Ext min restricted, Rot R/L min restricted, SB R/L WNL  Jt Mobility: hypomob thoracic & lumbar central PA spring testing    11/22/2024  Informed verbal consent for internal pelvic evaluation given: Yes, consent obtained with frequent check-ins throughout. Advise pain levels should be controlled. Education: pt to inform PT if requesting break/ stop for any reason at any time.    External Observation:   Voluntary contraction: present   Voluntary relaxation: present  Involuntary contraction: present  Involuntary relaxation: present - partial    Mons pubis: WNL  Labia majora: WNL  Labia minora: WNL  Urethral meatus: WNL  Introitus: WNL  Perineal body: WNL  +external hemorrhoids    Sensory/Reflex:  Vestibule: normal bilaterally    Internal Examination  Pelvic Floor Muscle strength: (PERF= Power/Endurance/Reps/Fast) MMT: 4-5/10/NT/NT  Accessory Muscle Use: gluteals    Tissue Laxity Test:  Anterior Wall: WNL  Posterior Wall: WNL  Apical: WNL    Internal Palpation: WNL except Pubococcygeus/Pubovaginalis B minimal restriction  Iliococcygeus B minimal restriction  Coccygeus B minimal restriction  Obturator Internus B minimal restriction  ^No pain to PFM      Assessment: Addressed thoracolumbar jt mobility & soft tissue mobility with pt education in spinal connection to PFD/ PF symptoms. Symptoms controlled with manual TX.       Goals: (to be met in 10 visits)  Patient will report consumption of at least 25 grams of daily fiber and 64 ounces  of water for improved stool consistency.  Patient will demonstrate optimal voiding mechanics and breath regulation with bowel movements for improved bowel emptying without straining, for reduced risk of POP, with avoiding sitting > 8' without active BM.  Patient will report Marinoff Scale 0-1 & will demonstrate PFM lengthening WNL with coordinated diaphragmatic breathing x 10 reps to alleviate pain and muscle tension during intercourse.  Patient will report pain at worst 4/10 with prolonged sitting during work day.  Patient will demonstrate B HS & piriformis muscle length WNL to alleviate tension in support structures of pelvic floor musculature.  Patient will report adherence to HEP for continued exercise benefits following cessation of PT.    Plan: Next visit: cont with prox hip strengthening & abdominal bracing, hip IR/ ER stretching; may re-assess internally with pt's informed consent for MFR for any STRs & for ensuring sufficient PFM lengthening  Date: 11/22/2024  TX#: 2/10 Date: 11/27/2024  TX#: 3/10 Date: 12/6/2024  TX#: 4/10 Date:                 TX#: 5/ Date:   TX#: 6/   Neuro Re-ed: 20'  Coccyx cushion: reduced rectal pain with sitting  With internal manual cueing: PFM lengthening with breathing coordination with use of analogies (including elevator)  -HEP: hooklying, seated; with Diaphragmatic Breathing       Manual Therapy: 20'  Internal PFM exam: see above for details.  -Explain mechanism of exam using pelvic model & clinical benefit/ purpose of exam. Discuss findings with pt including connection to POC.  -Aftercare: potential of mild soreness.  -MFR to layer 3 PFM B  Manual Therapy: 12'  Jt mobs: lower thoracic & lumbar central PA gr II-IV  STM: R/L T/L PSM  Lumbar myofascial stretch     Self-Care: 10'  About constipation, fiber facts - handouts provided. Discussed water intake relevant to fiber. Disclaimer PT is not a dietician & the above info is general education only.        Therex: 42'  Hooklying  hip Abd Annelise with Blue TB 10\" X 10  -with trial of bridge progression  Supine butterfly stretch 3 X 30\"  Active hooklying HS stretch 10\" X 5 ea  Figure-4 piriformis stretch 3 X 30\" ea  Cat-cow with breathing X 20  Child's pose stretch 3 X 30\"  Tail wags X 20 total  Thread the needle stretch with opp reach across 5\" X 5 ea  Open the book stretch 5\" X 5 ea  Flat back stretch 3 X 30\" ea  Standing hip Add stretch 2 X 30\" ea  Deep squat stretch at wall X 2'  ^HEP update Therex: 29'  Lumbar AROM assess  Prone hip IR stretch 10 X 10\" - HEP  TAISHA 10 X 10\" - HEP  Prone over 1 pillow: hip ext raises X 15 ea - HEP  S/L hip Abd X 15 ea - HEP  Standing sport cord shld ext 2 X 15 green  Standing sport cord row 2 X 15 blue  Standing sport cord paloff press 2 X 15 ea double blue       HEP: IE: belly hard belly big with squatty potty/ stepstool; colon massage (ILU massage); avoid sitting > 8 min while having BM  Access Code: [email or web address]  URL: [email or web address]  Date: 11/22/2024  Prepared by: Tawana Mcmahon    Exercises  - Pelvic Floor Lengthening in Hooklying  - 1 x daily - 7 x weekly - 2-3 min duration  - Happy Baby with Pelvic Floor Lengthening  - 1 x daily - 7 x weekly - 2-3 min hold  - Seated Pelvic Floor Lengthening  - 1 x daily - 7 x weekly - 2-3 min duration  - Supine Diaphragmatic Breathing with Pelvic Floor Lengthening  - 1 x daily - 7 x weekly - 2-3 min duration    Access Code: [email or web address]  URL: [email or web address]/  Date: 11/27/2024  Prepared by: Tawana Mcmaohn    Exercises  - Supine Butterfly Groin Stretch  - 1 x daily - 7 x weekly - 3 sets - 30 sec hold  - Hooklying Active Hamstring Stretch  - 1 x daily - 7 x weekly - 1 sets - 10 reps - 10 sec hold  - Seated Figure 4 Piriformis Stretch  - 1 x daily - 7 x weekly - 3 sets - 30 sec hold  - Supine Figure 4 Piriformis Stretch  - 1 x daily - 7 x weekly - 3 sets - 30 sec hold  - Cat Cow  - 1 x daily - 7 x weekly - 2 sets - 10 reps - 2-3 sec hold  -  Child's Pose Knees Apart and Hands Forward   - 1 x daily - 7 x weekly - 3 sets - 30 sec hold  - Tail Wag  - 1 x daily - 7 x weekly - 2 sets - 10 reps - 2-3 sec hold  - Sidelying Open Book Thoracic Lumbar Rotation and Extension  - 1 x daily - 7 x weekly - 3 sets - 10 reps - 5 sec hold  - Quadruped Full Range Thoracic Rotation with Reach  - 1 x daily - 7 x weekly - 1 sets - 10 reps - 5 sec hold  - Standing 'L' Stretch at Counter  - 1 x daily - 7 x weekly - 3 sets - 30 sec hold  - Kneeling Adductor Stretch with Hip External Rotation  - 1 x daily - 7 x weekly - 3 sets - 30 sec hold  - Deep Squat with Pelvic Floor Relaxation  - 1 x daily - 7 x weekly - 2-3 min duration    Access Code: [email or web address]  URL: [email or web address]  Date: 12/06/2024  Prepared by: Tawana Mcmahon    Exercises  - Static Prone on Elbows  - 1 x daily - 7 x weekly - 1 sets - 10 reps - 10 sec hold  - Prone Hip Extension with Pillow Under Abdomen  - 1 x daily - 4 x weekly - 2 sets - 15 reps  - Sidelying Hip Abduction  - 1 x daily - 4 x weekly - 2 sets - 15 reps  - Prone Hip Internal Rotation AROM  - 1 x daily - 7 x weekly - 1-2 sets - 10 reps - 10 sec hold    Charges: Therex X 2, Manual X 1       Total Timed Treatment: 41 min  Total Treatment Time: 41 min

## 2024-12-06 NOTE — PATIENT INSTRUCTIONS
Access Code: [email or web address]  URL: [email or web address]  Date: 12/06/2024  Prepared by: Tawana Mcmahon    Exercises  - Static Prone on Elbows  - 1 x daily - 7 x weekly - 1 sets - 10 reps - 10 sec hold  - Prone Hip Extension with Pillow Under Abdomen  - 1 x daily - 4 x weekly - 2 sets - 15 reps  - Sidelying Hip Abduction  - 1 x daily - 4 x weekly - 2 sets - 15 reps  - Prone Hip Internal Rotation AROM  - 1 x daily - 7 x weekly - 1-2 sets - 10 reps - 10 sec hold

## 2024-12-16 ENCOUNTER — OFFICE VISIT (OUTPATIENT)
Dept: PHYSICAL THERAPY | Age: 44
End: 2024-12-16
Payer: COMMERCIAL

## 2024-12-16 PROCEDURE — 97112 NEUROMUSCULAR REEDUCATION: CPT | Performed by: PHYSICAL THERAPIST

## 2024-12-16 PROCEDURE — 97140 MANUAL THERAPY 1/> REGIONS: CPT | Performed by: PHYSICAL THERAPIST

## 2024-12-17 NOTE — PROGRESS NOTES
Diagnosis:   High-tone pelvic floor dysfunction in female (M62.89)        Referring Provider: Physician Nonstaff  Date of Evaluation:    11/15/2024    Precautions:  None Next MD visit:   none scheduled  Date of Surgery: n/a   Insurance Primary/Secondary: BCBS OUT OF STATE / N/A     # Auth Visits: 20 visit limit            Subjective: Pt reports did well with travel- no increase in symptoms. No current rectal discomfort or pain. Cont with some low back stiffness    Pain: 0/10      Objective: See Flowsheet for details  12/16/2024  Palpation: STRs B glutes/ piriformis    12/6/2024  Lumbar AROM: Flex WNL, Ext min restricted, Rot R/L min restricted, SB R/L WNL  Jt Mobility: hypomob thoracic & lumbar central PA spring testing    11/22/2024  Informed verbal consent for internal pelvic evaluation given: Yes, consent obtained with frequent check-ins throughout. Advise pain levels should be controlled. Education: pt to inform PT if requesting break/ stop for any reason at any time.    External Observation:   Voluntary contraction: present   Voluntary relaxation: present  Involuntary contraction: present  Involuntary relaxation: present - partial    Mons pubis: WNL  Labia majora: WNL  Labia minora: WNL  Urethral meatus: WNL  Introitus: WNL  Perineal body: WNL  +external hemorrhoids    Sensory/Reflex:  Vestibule: normal bilaterally    Internal Examination  Pelvic Floor Muscle strength: (PERF= Power/Endurance/Reps/Fast) MMT: 4-5/10/NT/NT  Accessory Muscle Use: gluteals    Tissue Laxity Test:  Anterior Wall: WNL  Posterior Wall: WNL  Apical: WNL    Internal Palpation: WNL except Pubococcygeus/Pubovaginalis B minimal restriction  Iliococcygeus B minimal restriction  Coccygeus B minimal restriction  Obturator Internus B minimal restriction  ^No pain to PFM      Assessment: Addressed external soft tissue mobility to glutes with pt education in connection to PFD. Symptoms controlled with manual TX. With instruction in proper form for  low ab bracing, pt able to replicate proper technique without excessive upper ab compensation.      Goals: (to be met in 10 visits)  Patient will report consumption of at least 25 grams of daily fiber and 64 ounces of water for improved stool consistency.  Patient will demonstrate optimal voiding mechanics and breath regulation with bowel movements for improved bowel emptying without straining, for reduced risk of POP, with avoiding sitting > 8' without active BM.  Patient will report Marinoff Scale 0-1 & will demonstrate PFM lengthening WNL with coordinated diaphragmatic breathing x 10 reps to alleviate pain and muscle tension during intercourse.  Patient will report pain at worst 4/10 with prolonged sitting during work day.  Patient will demonstrate B HS & piriformis muscle length WNL to alleviate tension in support structures of pelvic floor musculature.  Patient will report adherence to HEP for continued exercise benefits following cessation of PT.    Plan: Next visit: cont with prox hip strengthening, hip IR/ ER stretching; may re-assess internally with pt's informed consent for MFR for any STRs & for ensuring sufficient PFM lengthening  Date: 11/22/2024  TX#: 2/10 Date: 11/27/2024  TX#: 3/10 Date: 12/6/2024  TX#: 4/10 Date: 12/16/2024  TX#: 5/10 Date:   TX#: 6/   Neuro Re-ed: 20'  Coccyx cushion: reduced rectal pain with sitting  With internal manual cueing: PFM lengthening with breathing coordination with use of analogies (including elevator)  -HEP: hooklying, seated; with Diaphragmatic Breathing   Neuro Re-ed: 20'  TA bracing (with education in connection to PFD):  -with breathing coordination in hooklying  Seated PFM lengthening with two folded towels  Dyspareunia- positions    Manual Therapy: 20'  Internal PFM exam: see above for details.  -Explain mechanism of exam using pelvic model & clinical benefit/ purpose of exam. Discuss findings with pt including connection to POC.  -Aftercare: potential of mild  soreness.  -MFR to layer 3 PFM B  Manual Therapy: 12'  Jt mobs: lower thoracic & lumbar central PA gr II-IV  STM: R/L T/L PSM  Lumbar myofascial stretch Manual Therapy: 25'  STM: B glutes/ piriformis (over clothing in R/L sidelying)    Self-Care: 10'  About constipation, fiber facts - handouts provided. Discussed water intake relevant to fiber. Disclaimer PT is not a dietician & the above info is general education only.        Therex: 42'  Hooklying hip Abd Annelise with Blue TB 10\" X 10  -with trial of bridge progression  Supine butterfly stretch 3 X 30\"  Active hooklying HS stretch 10\" X 5 ea  Figure-4 piriformis stretch 3 X 30\" ea  Cat-cow with breathing X 20  Child's pose stretch 3 X 30\"  Tail wags X 20 total  Thread the needle stretch with opp reach across 5\" X 5 ea  Open the book stretch 5\" X 5 ea  Flat back stretch 3 X 30\" ea  Standing hip Add stretch 2 X 30\" ea  Deep squat stretch at wall X 2'  ^HEP update Therex: 29'  Lumbar AROM assess  Prone hip IR stretch 10 X 10\" - HEP  TAISHA 10 X 10\" - HEP  Prone over 1 pillow: hip ext raises X 15 ea - HEP  S/L hip Abd X 15 ea - HEP  Standing sport cord shld ext 2 X 15 green  Standing sport cord row 2 X 15 blue  Standing sport cord paloff press 2 X 15 ea double blue       HEP: IE: belly hard belly big with squatty potty/ stepstool; colon massage (ILU massage); avoid sitting > 8 min while having BM  Access Code: [email or web address]  URL: [email or web address]  Date: 11/22/2024  Prepared by: Tawana Mcmahon    Exercises  - Pelvic Floor Lengthening in Hooklying  - 1 x daily - 7 x weekly - 2-3 min duration  - Happy Baby with Pelvic Floor Lengthening  - 1 x daily - 7 x weekly - 2-3 min hold  - Seated Pelvic Floor Lengthening  - 1 x daily - 7 x weekly - 2-3 min duration  - Supine Diaphragmatic Breathing with Pelvic Floor Lengthening  - 1 x daily - 7 x weekly - 2-3 min duration    Access Code: [email or web address]  URL: [email or web address]  Date: 11/27/2024  Prepared  by: Tawana Mcmahon    Exercises  - Supine Butterfly Groin Stretch  - 1 x daily - 7 x weekly - 3 sets - 30 sec hold  - Hooklying Active Hamstring Stretch  - 1 x daily - 7 x weekly - 1 sets - 10 reps - 10 sec hold  - Seated Figure 4 Piriformis Stretch  - 1 x daily - 7 x weekly - 3 sets - 30 sec hold  - Supine Figure 4 Piriformis Stretch  - 1 x daily - 7 x weekly - 3 sets - 30 sec hold  - Cat Cow  - 1 x daily - 7 x weekly - 2 sets - 10 reps - 2-3 sec hold  - Child's Pose Knees Apart and Hands Forward   - 1 x daily - 7 x weekly - 3 sets - 30 sec hold  - Tail Wag  - 1 x daily - 7 x weekly - 2 sets - 10 reps - 2-3 sec hold  - Sidelying Open Book Thoracic Lumbar Rotation and Extension  - 1 x daily - 7 x weekly - 3 sets - 10 reps - 5 sec hold  - Quadruped Full Range Thoracic Rotation with Reach  - 1 x daily - 7 x weekly - 1 sets - 10 reps - 5 sec hold  - Standing 'L' Stretch at Counter  - 1 x daily - 7 x weekly - 3 sets - 30 sec hold  - Kneeling Adductor Stretch with Hip External Rotation  - 1 x daily - 7 x weekly - 3 sets - 30 sec hold  - Deep Squat with Pelvic Floor Relaxation  - 1 x daily - 7 x weekly - 2-3 min duration    Access Code: [email or web address]  URL: [email or web address]  Date: 12/06/2024  Prepared by: Tawana Mcmahon    Exercises  - Static Prone on Elbows  - 1 x daily - 7 x weekly - 1 sets - 10 reps - 10 sec hold  - Prone Hip Extension with Pillow Under Abdomen  - 1 x daily - 4 x weekly - 2 sets - 15 reps  - Sidelying Hip Abduction  - 1 x daily - 4 x weekly - 2 sets - 15 reps  - Prone Hip Internal Rotation AROM  - 1 x daily - 7 x weekly - 1-2 sets - 10 reps - 10 sec hold    Charges: Manual X 2, Neuro X 1       Total Timed Treatment: 45 min  Total Treatment Time: 45 min

## 2024-12-27 ENCOUNTER — OFFICE VISIT (OUTPATIENT)
Dept: PHYSICAL THERAPY | Age: 44
End: 2024-12-27
Payer: COMMERCIAL

## 2024-12-27 PROCEDURE — 97110 THERAPEUTIC EXERCISES: CPT | Performed by: PHYSICAL THERAPIST

## 2024-12-27 PROCEDURE — 97140 MANUAL THERAPY 1/> REGIONS: CPT | Performed by: PHYSICAL THERAPIST

## 2024-12-27 NOTE — PROGRESS NOTES
Diagnosis:   High-tone pelvic floor dysfunction in female (M62.89)        Referring Provider: Physician Nonstaff  Date of Evaluation:    11/15/2024    Precautions:  None Next MD visit:   none scheduled  Date of Surgery: n/a   Insurance Primary/Secondary: BCBS OUT OF STATE / N/A     # Auth Visits: 20 visit limit            Subjective: Pt reports for the most part feeling fairly normal; no pain or bleeding. Cont with some low back stiffness. Went to yoga this morning, got a massage    Pain: 0/10      Objective: See Flowsheet for details  12/27/2024  Palpation: no TTP though min restriction external OI (with pt informed consent), min restriction abdominal fascial glide    12/16/2024  Palpation: STRs B glutes/ piriformis    12/6/2024  Lumbar AROM: Flex WNL, Ext min restricted, Rot R/L min restricted, SB R/L WNL  Jt Mobility: hypomob thoracic & lumbar central PA spring testing    11/22/2024  Informed verbal consent for internal pelvic evaluation given: Yes, consent obtained with frequent check-ins throughout. Advise pain levels should be controlled. Education: pt to inform PT if requesting break/ stop for any reason at any time.    External Observation:   Voluntary contraction: present   Voluntary relaxation: present  Involuntary contraction: present  Involuntary relaxation: present - partial    Mons pubis: WNL  Labia majora: WNL  Labia minora: WNL  Urethral meatus: WNL  Introitus: WNL  Perineal body: WNL  +external hemorrhoids    Sensory/Reflex:  Vestibule: normal bilaterally    Internal Examination  Pelvic Floor Muscle strength: (PERF= Power/Endurance/Reps/Fast) MMT: 4-5/10/NT/NT  Accessory Muscle Use: gluteals    Tissue Laxity Test:  Anterior Wall: WNL  Posterior Wall: WNL  Apical: WNL    Internal Palpation: WNL except Pubococcygeus/Pubovaginalis B minimal restriction  Iliococcygeus B minimal restriction  Coccygeus B minimal restriction  Obturator Internus B minimal restriction  ^No pain to PFM      Assessment: Pt  declined internal, opted for external. No pain or symptoms with external manual work.       Goals: (to be met in 10 visits)  Patient will report consumption of at least 25 grams of daily fiber and 64 ounces of water for improved stool consistency.  Patient will demonstrate optimal voiding mechanics and breath regulation with bowel movements for improved bowel emptying without straining, for reduced risk of POP, with avoiding sitting > 8' without active BM.  Patient will report Marinoff Scale 0-1 & will demonstrate PFM lengthening WNL with coordinated diaphragmatic breathing x 10 reps to alleviate pain and muscle tension during intercourse.  Patient will report pain at worst 4/10 with prolonged sitting during work day.  Patient will demonstrate B HS & piriformis muscle length WNL to alleviate tension in support structures of pelvic floor musculature.  Patient will report adherence to HEP for continued exercise benefits following cessation of PT.    Plan: Next visit: cont with prox hip strengthening, hip IR/ ER stretching; may re-assess internally with pt's informed consent for MFR for any STRs & for ensuring sufficient PFM lengthening  Date: 11/22/2024  TX#: 2/10 Date: 11/27/2024  TX#: 3/10 Date: 12/6/2024  TX#: 4/10 Date: 12/16/2024  TX#: 5/10 Date: 12/27/2024  TX#: 6/10   Neuro Re-ed: 20'  Coccyx cushion: reduced rectal pain with sitting  With internal manual cueing: PFM lengthening with breathing coordination with use of analogies (including elevator)  -HEP: hooklying, seated; with Diaphragmatic Breathing   Neuro Re-ed: 20'  TA bracing (with education in connection to PFD):  -with breathing coordination in hooklying  Seated PFM lengthening with two folded towels  Dyspareunia- positions    Manual Therapy: 20'  Internal PFM exam: see above for details.  -Explain mechanism of exam using pelvic model & clinical benefit/ purpose of exam. Discuss findings with pt including connection to POC.  -Aftercare: potential of  mild soreness.  -MFR to layer 3 PFM B  Manual Therapy: 12'  Jt mobs: lower thoracic & lumbar central PA gr II-IV  STM: R/L T/L PSM  Lumbar myofascial stretch Manual Therapy: 25'  STM: B glutes/ piriformis (over clothing in R/L sidelying) Manual Therapy: 35'  MFR: R/L external OI (gloved, over clothing), gloved- abdominal cross-hand release, pelvic diaphragm release, diaphragm release R/L   Self-Care: 10'  About constipation, fiber facts - handouts provided. Discussed water intake relevant to fiber. Disclaimer PT is not a dietician & the above info is general education only.        Therex: 42'  Hooklying hip Abd Annelise with Blue TB 10\" X 10  -with trial of bridge progression  Supine butterfly stretch 3 X 30\"  Active hooklying HS stretch 10\" X 5 ea  Figure-4 piriformis stretch 3 X 30\" ea  Cat-cow with breathing X 20  Child's pose stretch 3 X 30\"  Tail wags X 20 total  Thread the needle stretch with opp reach across 5\" X 5 ea  Open the book stretch 5\" X 5 ea  Flat back stretch 3 X 30\" ea  Standing hip Add stretch 2 X 30\" ea  Deep squat stretch at wall X 2'  ^HEP update Therex: 29'  Lumbar AROM assess  Prone hip IR stretch 10 X 10\" - HEP  TAISHA 10 X 10\" - HEP  Prone over 1 pillow: hip ext raises X 15 ea - HEP  S/L hip Abd X 15 ea - HEP  Standing sport cord shld ext 2 X 15 green  Standing sport cord row 2 X 15 blue  Standing sport cord paloff press 2 X 15 ea double blue    Therex: 8'  Standing doorway QL stretch trial  1/2 kneel hip flexor stretch with SB trial  Deep squat stretch (no wall) trial   HEP: IE: belly hard belly big with squatty potty/ stepstool; colon massage (ILU massage); avoid sitting > 8 min while having BM  Access Code: [email or web address]  URL: [email or web address]  Date: 11/22/2024  Prepared by: Tawana Mcmahon    Exercises  - Pelvic Floor Lengthening in Hooklying  - 1 x daily - 7 x weekly - 2-3 min duration  - Happy Baby with Pelvic Floor Lengthening  - 1 x daily - 7 x weekly - 2-3 min hold  - Seated  Pelvic Floor Lengthening  - 1 x daily - 7 x weekly - 2-3 min duration  - Supine Diaphragmatic Breathing with Pelvic Floor Lengthening  - 1 x daily - 7 x weekly - 2-3 min duration    Access Code: [email or web address]  URL: [email or web address]/  Date: 11/27/2024  Prepared by: Tawana Mcmahon    Exercises  - Supine Butterfly Groin Stretch  - 1 x daily - 7 x weekly - 3 sets - 30 sec hold  - Hooklying Active Hamstring Stretch  - 1 x daily - 7 x weekly - 1 sets - 10 reps - 10 sec hold  - Seated Figure 4 Piriformis Stretch  - 1 x daily - 7 x weekly - 3 sets - 30 sec hold  - Supine Figure 4 Piriformis Stretch  - 1 x daily - 7 x weekly - 3 sets - 30 sec hold  - Cat Cow  - 1 x daily - 7 x weekly - 2 sets - 10 reps - 2-3 sec hold  - Child's Pose Knees Apart and Hands Forward   - 1 x daily - 7 x weekly - 3 sets - 30 sec hold  - Tail Wag  - 1 x daily - 7 x weekly - 2 sets - 10 reps - 2-3 sec hold  - Sidelying Open Book Thoracic Lumbar Rotation and Extension  - 1 x daily - 7 x weekly - 3 sets - 10 reps - 5 sec hold  - Quadruped Full Range Thoracic Rotation with Reach  - 1 x daily - 7 x weekly - 1 sets - 10 reps - 5 sec hold  - Standing 'L' Stretch at Counter  - 1 x daily - 7 x weekly - 3 sets - 30 sec hold  - Kneeling Adductor Stretch with Hip External Rotation  - 1 x daily - 7 x weekly - 3 sets - 30 sec hold  - Deep Squat with Pelvic Floor Relaxation  - 1 x daily - 7 x weekly - 2-3 min duration    Access Code: [email or web address]  URL: [email or web address]/  Date: 12/06/2024  Prepared by: Tawana Mcmahon    Exercises  - Static Prone on Elbows  - 1 x daily - 7 x weekly - 1 sets - 10 reps - 10 sec hold  - Prone Hip Extension with Pillow Under Abdomen  - 1 x daily - 4 x weekly - 2 sets - 15 reps  - Sidelying Hip Abduction  - 1 x daily - 4 x weekly - 2 sets - 15 reps  - Prone Hip Internal Rotation AROM  - 1 x daily - 7 x weekly - 1-2 sets - 10 reps - 10 sec hold    Charges: Manual X 2, Therex X 1       Total Timed Treatment:  43 min  Total Treatment Time: 43 min

## 2025-01-03 ENCOUNTER — OFFICE VISIT (OUTPATIENT)
Dept: PHYSICAL THERAPY | Age: 45
End: 2025-01-03
Payer: COMMERCIAL

## 2025-01-03 PROCEDURE — 97140 MANUAL THERAPY 1/> REGIONS: CPT | Performed by: PHYSICAL THERAPIST

## 2025-01-03 PROCEDURE — 97110 THERAPEUTIC EXERCISES: CPT | Performed by: PHYSICAL THERAPIST

## 2025-01-03 NOTE — PATIENT INSTRUCTIONS
Access Code: [email or web address]  URL: [email or web address]  Date: 01/03/2025  Prepared by: Tawana Mcmahon    Exercises  - Quadruped Adductor Stretch  - 1 x daily - 7 x weekly - 3 sets - 30 sec hold  - [place] Pose  - 1 x daily - 7 x weekly - 3 sets - 30 sec hold  - Posterior Chain Stretch  - 1 x daily - 7 x weekly - 3 sets - 30 sec hold

## 2025-01-03 NOTE — PROGRESS NOTES
Diagnosis:   High-tone pelvic floor dysfunction in female (M62.89)        Referring Provider: Physician Nonstaff  Date of Evaluation:    11/15/2024    Precautions:  None Next MD visit:   none scheduled  Date of Surgery: n/a   Insurance Primary/Secondary: BCBS OUT OF STATE / N/A     # Auth Visits: 20 visit limit            Subjective: Pt reports felt a little painful pop in her low back with working out on Sun (mid squat); has been getting better with alternating ice/ heat. No other different symptoms but is having some increase tightness to her back. Symptoms more on the L. No N/T or referred BLE pain    Pain: 0/10      Objective: See Flowsheet for details  1/3/2025  Pelvic Alignment: mild L post ilial rot  PROM: Tight L>R hip IR & R>L hip ER      Assessment: Improved pelvic alignment post-MET techniques. Pain levels controlled throughout.       Goals: (to be met in 10 visits)  Patient will report consumption of at least 25 grams of daily fiber and 64 ounces of water for improved stool consistency.  Patient will demonstrate optimal voiding mechanics and breath regulation with bowel movements for improved bowel emptying without straining, for reduced risk of POP, with avoiding sitting > 8' without active BM.  Patient will report Marinoff Scale 0-1 & will demonstrate PFM lengthening WNL with coordinated diaphragmatic breathing x 10 reps to alleviate pain and muscle tension during intercourse.  Patient will report pain at worst 4/10 with prolonged sitting during work day.  Patient will demonstrate B HS & piriformis muscle length WNL to alleviate tension in support structures of pelvic floor musculature.  Patient will report adherence to HEP for continued exercise benefits following cessation of PT.    Plan: Next visit: may re-assess internally with pt's informed consent for MFR for any STRs & for ensuring sufficient PFM lengthening  Date: 11/22/2024  TX#: 2/10 Date: 11/27/2024  TX#: 3/10 Date: 12/6/2024  TX#: 4/10  Date: 12/16/2024  TX#: 5/10 Date: 12/27/2024  TX#: 6/10 Date: 1/3/2025  TX#: 7/10   Neuro Re-ed: 20'  Coccyx cushion: reduced rectal pain with sitting  With internal manual cueing: PFM lengthening with breathing coordination with use of analogies (including elevator)  -HEP: hooklying, seated; with Diaphragmatic Breathing   Neuro Re-ed: 20'  TA bracing (with education in connection to PFD):  -with breathing coordination in hooklying  Seated PFM lengthening with two folded towels  Dyspareunia- positions  Neuro Re-ed: 6'  MET techniques to correct pelvic obliquity   Manual Therapy: 20'  Internal PFM exam: see above for details.  -Explain mechanism of exam using pelvic model & clinical benefit/ purpose of exam. Discuss findings with pt including connection to POC.  -Aftercare: potential of mild soreness.  -MFR to layer 3 PFM B  Manual Therapy: 12'  Jt mobs: lower thoracic & lumbar central PA gr II-IV  STM: R/L T/L PSM  Lumbar myofascial stretch Manual Therapy: 25'  STM: B glutes/ piriformis (over clothing in R/L sidelying) Manual Therapy: 35'  MFR: R/L external OI (gloved, over clothing), gloved- abdominal cross-hand release, pelvic diaphragm release, diaphragm release R/L Manual Therapy: 24'  Jt mobs: lumbosacral central PA gr II-IV  STM L glutes, piriformis   Self-Care: 10'  About constipation, fiber facts - handouts provided. Discussed water intake relevant to fiber. Disclaimer PT is not a dietician & the above info is general education only.         Therex: 42'  Hooklying hip Abd Annelise with Blue TB 10\" X 10  -with trial of bridge progression  Supine butterfly stretch 3 X 30\"  Active hooklying HS stretch 10\" X 5 ea  Figure-4 piriformis stretch 3 X 30\" ea  Cat-cow with breathing X 20  Child's pose stretch 3 X 30\"  Tail wags X 20 total  Thread the needle stretch with opp reach across 5\" X 5 ea  Open the book stretch 5\" X 5 ea  Flat back stretch 3 X 30\" ea  Standing hip Add stretch 2 X 30\" ea  Deep squat stretch at wall  X 2'  ^HEP update Therex: 29'  Lumbar AROM assess  Prone hip IR stretch 10 X 10\" - HEP  TAISHA 10 X 10\" - HEP  Prone over 1 pillow: hip ext raises X 15 ea - HEP  S/L hip Abd X 15 ea - HEP  Standing sport cord shld ext 2 X 15 green  Standing sport cord row 2 X 15 blue  Standing sport cord paloff press 2 X 15 ea double blue    Therex: 8'  Standing doorway QL stretch trial  1/2 kneel hip flexor stretch with SB trial  Deep squat stretch (no wall) trial Therex: 10'  Modified pigeon stretch with back leg bent - trial  -with rotation: post chain stretch  Modified frog stretch - trial   HEP: IE: belly hard belly big with squatty potty/ stepstool; colon massage (ILU massage); avoid sitting > 8 min while having BM  Access Code: [email or web address]  URL: [email or web address]  Date: 11/22/2024  Prepared by: Tawana Mcmahon    Exercises  - Pelvic Floor Lengthening in Hooklying  - 1 x daily - 7 x weekly - 2-3 min duration  - Happy Baby with Pelvic Floor Lengthening  - 1 x daily - 7 x weekly - 2-3 min hold  - Seated Pelvic Floor Lengthening  - 1 x daily - 7 x weekly - 2-3 min duration  - Supine Diaphragmatic Breathing with Pelvic Floor Lengthening  - 1 x daily - 7 x weekly - 2-3 min duration    Access Code: [email or web address]  URL: [email or web address]  Date: 11/27/2024  Prepared by: Tawana Mcmahon    Exercises  - Supine Butterfly Groin Stretch  - 1 x daily - 7 x weekly - 3 sets - 30 sec hold  - Hooklying Active Hamstring Stretch  - 1 x daily - 7 x weekly - 1 sets - 10 reps - 10 sec hold  - Seated Figure 4 Piriformis Stretch  - 1 x daily - 7 x weekly - 3 sets - 30 sec hold  - Supine Figure 4 Piriformis Stretch  - 1 x daily - 7 x weekly - 3 sets - 30 sec hold  - Cat Cow  - 1 x daily - 7 x weekly - 2 sets - 10 reps - 2-3 sec hold  - Child's Pose Knees Apart and Hands Forward   - 1 x daily - 7 x weekly - 3 sets - 30 sec hold  - Tail Wag  - 1 x daily - 7 x weekly - 2 sets - 10 reps - 2-3 sec hold  - Sidelying Open Book Thoracic  Lumbar Rotation and Extension  - 1 x daily - 7 x weekly - 3 sets - 10 reps - 5 sec hold  - Quadruped Full Range Thoracic Rotation with Reach  - 1 x daily - 7 x weekly - 1 sets - 10 reps - 5 sec hold  - Standing 'L' Stretch at Counter  - 1 x daily - 7 x weekly - 3 sets - 30 sec hold  - Kneeling Adductor Stretch with Hip External Rotation  - 1 x daily - 7 x weekly - 3 sets - 30 sec hold  - Deep Squat with Pelvic Floor Relaxation  - 1 x daily - 7 x weekly - 2-3 min duration    Access Code: [email or web address]  URL: [email or web address]  Date: 12/06/2024  Prepared by: Tawana Mcmahon    Exercises  - Static Prone on Elbows  - 1 x daily - 7 x weekly - 1 sets - 10 reps - 10 sec hold  - Prone Hip Extension with Pillow Under Abdomen  - 1 x daily - 4 x weekly - 2 sets - 15 reps  - Sidelying Hip Abduction  - 1 x daily - 4 x weekly - 2 sets - 15 reps  - Prone Hip Internal Rotation AROM  - 1 x daily - 7 x weekly - 1-2 sets - 10 reps - 10 sec hold    Access Code: [email or web address]  URL: [email or web address]  Date: 01/03/2025  Prepared by: Tawana Mcmahon    Exercises  - Quadruped Adductor Stretch  - 1 x daily - 7 x weekly - 3 sets - 30 sec hold  - [place] Pose  - 1 x daily - 7 x weekly - 3 sets - 30 sec hold  - Posterior Chain Stretch  - 1 x daily - 7 x weekly - 3 sets - 30 sec hold    Charges: Manual X 2, Therex X 1       Total Timed Treatment: 40 min  Total Treatment Time: 40 min

## 2025-01-10 ENCOUNTER — OFFICE VISIT (OUTPATIENT)
Dept: PHYSICAL THERAPY | Age: 45
End: 2025-01-10
Payer: COMMERCIAL

## 2025-01-10 PROCEDURE — 97112 NEUROMUSCULAR REEDUCATION: CPT | Performed by: PHYSICAL THERAPIST

## 2025-01-10 PROCEDURE — 97535 SELF CARE MNGMENT TRAINING: CPT | Performed by: PHYSICAL THERAPIST

## 2025-01-10 PROCEDURE — 97110 THERAPEUTIC EXERCISES: CPT | Performed by: PHYSICAL THERAPIST

## 2025-01-10 NOTE — PROGRESS NOTES
Diagnosis:   High-tone pelvic floor dysfunction in female (M62.89)        Referring Provider: Physician Nonstaff  Date of Evaluation:    11/15/2024    Precautions:  None Next MD visit:   none scheduled  Date of Surgery: n/a   Insurance Primary/Secondary: BCBS OUT OF STATE / N/A     # Auth Visits: 20 visit limit            Subjective: Pt reports her back is feeling a lot better, ice/ heat->transitioned to heat. No rectal pain; but some incomplete bowel emptying. Does not typically have much pain in general including with sitting; intercourse is getting better. Bleeding has substantially reduced. Cont with BSS #4-5; maybe down to 15' on avg for defecation. Pt had follow up with referring provider- did exam: looking better, reduced inflammation, tissue is softer.    Pain: 0/10      Objective: See Flowsheet for details  1/10/2025  Informed verbal consent for internal pelvic evaluation given: Yes, consent obtained with frequent check-ins throughout    External Observation:   Voluntary contraction: present   Voluntary relaxation: present  Involuntary relaxation: present upon cueing    Mons pubis: WNL  Labia majora: WNL  Labia minora: WNL  Urethral meatus: WNL  Introitus: WNL  Perineal body: WNL  +external hemorrhoids    Sensory/Reflex:  Vestibule: normal bilaterally    Internal Palpation: WNL      Assessment: Improved ability to lengthen PFM compared to IE. Still req some cueing but overall improved mastery noted. Pt to cont to work on this for HEP.       Goals: (to be met in 10 visits)  Patient will report consumption of at least 25 grams of daily fiber and 64 ounces of water for improved stool consistency. -progressing towards  Patient will demonstrate optimal voiding mechanics and breath regulation with bowel movements for improved bowel emptying without straining, for reduced risk of POP, with avoiding sitting > 8' without active BM. -progressing towards  Patient will report Marinoff Scale 0-1 & will demonstrate PFM  lengthening WNL with coordinated diaphragmatic breathing x 10 reps to alleviate pain and muscle tension during intercourse. -progressing towards  Patient will report pain at worst 4/10 with prolonged sitting during work day. -met  Patient will demonstrate B HS & piriformis muscle length WNL to alleviate tension in support structures of pelvic floor musculature.  Patient will report adherence to HEP for continued exercise benefits following cessation of PT.    Plan: Advance additional hip strengthening, progress towards discharge  Date: 11/22/2024  TX#: 2/10 Date: 11/27/2024  TX#: 3/10 Date: 12/6/2024  TX#: 4/10 Date: 12/16/2024  TX#: 5/10 Date: 12/27/2024  TX#: 6/10 Date: 1/3/2025  TX#: 7/10 Date: 1/10/2025  TX#: 8/10   Neuro Re-ed: 20'  Coccyx cushion: reduced rectal pain with sitting  With internal manual cueing: PFM lengthening with breathing coordination with use of analogies (including elevator)  -HEP: hooklying, seated; with Diaphragmatic Breathing   Neuro Re-ed: 20'  TA bracing (with education in connection to PFD):  -with breathing coordination in hooklying  Seated PFM lengthening with two folded towels  Dyspareunia- positions  Neuro Re-ed: 6'  MET techniques to correct pelvic obliquity Neuro Re-ed: 15'  Internal PFM re-check:  -coordinating PFM with breathing: PFM lengthening/ relaxation concurrent with PT cueing  -pt encouraged to cont to work on this  Education: void before intercourse to test if any UI is noted with attempt - ensure empty bladder   Manual Therapy: 20'  Internal PFM exam: see above for details.  -Explain mechanism of exam using pelvic model & clinical benefit/ purpose of exam. Discuss findings with pt including connection to POC.  -Aftercare: potential of mild soreness.  -MFR to layer 3 PFM B  Manual Therapy: 12'  Jt mobs: lower thoracic & lumbar central PA gr II-IV  STM: R/L T/L PSM  Lumbar myofascial stretch Manual Therapy: 25'  STM: B glutes/ piriformis (over clothing in R/L sidelying)  Manual Therapy: 35'  MFR: R/L external OI (gloved, over clothing), gloved- abdominal cross-hand release, pelvic diaphragm release, diaphragm release R/L Manual Therapy: 24'  Jt mobs: lumbosacral central PA gr II-IV  STM L glutes, piriformis    Self-Care: 10'  About constipation, fiber facts - handouts provided. Discussed water intake relevant to fiber. Disclaimer PT is not a dietician & the above info is general education only.      Self-Care: 10'  Check diet: fiber, water for stool consistency      Therex: 42'  Hooklying hip Abd Annelise with Blue TB 10\" X 10  -with trial of bridge progression  Supine butterfly stretch 3 X 30\"  Active hooklying HS stretch 10\" X 5 ea  Figure-4 piriformis stretch 3 X 30\" ea  Cat-cow with breathing X 20  Child's pose stretch 3 X 30\"  Tail wags X 20 total  Thread the needle stretch with opp reach across 5\" X 5 ea  Open the book stretch 5\" X 5 ea  Flat back stretch 3 X 30\" ea  Standing hip Add stretch 2 X 30\" ea  Deep squat stretch at wall X 2'  ^HEP update Therex: 29'  Lumbar AROM assess  Prone hip IR stretch 10 X 10\" - HEP  TAISHA 10 X 10\" - HEP  Prone over 1 pillow: hip ext raises X 15 ea - HEP  S/L hip Abd X 15 ea - HEP  Standing sport cord shld ext 2 X 15 green  Standing sport cord row 2 X 15 blue  Standing sport cord paloff press 2 X 15 ea double blue    Therex: 8'  Standing doorway QL stretch trial  1/2 kneel hip flexor stretch with SB trial  Deep squat stretch (no wall) trial Therex: 10'  Modified pigeon stretch with back leg bent - trial  -with rotation: post chain stretch  Modified frog stretch - trial Therex: 14'  Discharge planning  Education: Heating pad to abdomen/ pelvis   HEP: IE: belly hard belly big with squatty potty/ stepstool; colon massage (ILU massage); avoid sitting > 8 min while having BM  Access Code: [email or web address]  URL: [email or web address]  Date: 11/22/2024  Prepared by: Tawana Mcmahon    Exercises  - Pelvic Floor Lengthening in Hooklying  - 1 x daily - 7  x weekly - 2-3 min duration  - Happy Baby with Pelvic Floor Lengthening  - 1 x daily - 7 x weekly - 2-3 min hold  - Seated Pelvic Floor Lengthening  - 1 x daily - 7 x weekly - 2-3 min duration  - Supine Diaphragmatic Breathing with Pelvic Floor Lengthening  - 1 x daily - 7 x weekly - 2-3 min duration    Access Code: [email or web address]  URL: [email or web address]  Date: 11/27/2024  Prepared by: Tawana Mcmahon    Exercises  - Supine Butterfly Groin Stretch  - 1 x daily - 7 x weekly - 3 sets - 30 sec hold  - Hooklying Active Hamstring Stretch  - 1 x daily - 7 x weekly - 1 sets - 10 reps - 10 sec hold  - Seated Figure 4 Piriformis Stretch  - 1 x daily - 7 x weekly - 3 sets - 30 sec hold  - Supine Figure 4 Piriformis Stretch  - 1 x daily - 7 x weekly - 3 sets - 30 sec hold  - Cat Cow  - 1 x daily - 7 x weekly - 2 sets - 10 reps - 2-3 sec hold  - Child's Pose Knees Apart and Hands Forward   - 1 x daily - 7 x weekly - 3 sets - 30 sec hold  - Tail Wag  - 1 x daily - 7 x weekly - 2 sets - 10 reps - 2-3 sec hold  - Sidelying Open Book Thoracic Lumbar Rotation and Extension  - 1 x daily - 7 x weekly - 3 sets - 10 reps - 5 sec hold  - Quadruped Full Range Thoracic Rotation with Reach  - 1 x daily - 7 x weekly - 1 sets - 10 reps - 5 sec hold  - Standing 'L' Stretch at Counter  - 1 x daily - 7 x weekly - 3 sets - 30 sec hold  - Kneeling Adductor Stretch with Hip External Rotation  - 1 x daily - 7 x weekly - 3 sets - 30 sec hold  - Deep Squat with Pelvic Floor Relaxation  - 1 x daily - 7 x weekly - 2-3 min duration    Access Code: [email or web address]  URL: [email or web address]  Date: 12/06/2024  Prepared by: Tawana Mcmahon    Exercises  - Static Prone on Elbows  - 1 x daily - 7 x weekly - 1 sets - 10 reps - 10 sec hold  - Prone Hip Extension with Pillow Under Abdomen  - 1 x daily - 4 x weekly - 2 sets - 15 reps  - Sidelying Hip Abduction  - 1 x daily - 4 x weekly - 2 sets - 15 reps  - Prone Hip Internal Rotation AROM  -  1 x daily - 7 x weekly - 1-2 sets - 10 reps - 10 sec hold    Access Code: [email or web address]  URL: [email or web address]  Date: 01/03/2025  Prepared by: Tawana Mcmahon    Exercises  - Quadruped Adductor Stretch  - 1 x daily - 7 x weekly - 3 sets - 30 sec hold  - [place] Pose  - 1 x daily - 7 x weekly - 3 sets - 30 sec hold  - Posterior Chain Stretch  - 1 x daily - 7 x weekly - 3 sets - 30 sec hold    Charges: Neuro X 1, Self-Care X 1, Therex X 1       Total Timed Treatment: 39 min  Total Treatment Time: 39 min

## 2025-01-15 NOTE — TELEPHONE ENCOUNTER
Patient: Paxton Meredith    Procedure: Procedure(s):  HYSTERECTOMY, TOTAL, LAPAROSCOPIC, WITH BILATERAL SALPINGECTOMY, cystoscopy       Diagnosis: Dysmenorrhea [N94.6]  Gender dysphoria in adult [F64.0]  Diagnosis Additional Information: No value filed.    Anesthesia Type:   General     Note:    Oropharynx: oropharynx clear of all foreign objects and spontaneously breathing  Level of Consciousness: drowsy  Oxygen Supplementation: face mask  Level of Supplemental Oxygen (L/min / FiO2): 6  Independent Airway: airway patency satisfactory and stable  Dentition: dentition unchanged  Vital Signs Stable: post-procedure vital signs reviewed and stable  Report to RN Given: handoff report given  Patient transferred to: PACU    Handoff Report: Identifed the Patient, Identified the Reponsible Provider, Reviewed the pertinent medical history, Discussed the surgical course, Reviewed Intra-OP anesthesia mangement and issues during anesthesia, Set expectations for post-procedure period and Allowed opportunity for questions and acknowledgement of understanding      Vitals:  Vitals Value Taken Time   BP 93/48 01/15/25 1010   Temp 36.7    Pulse 100 01/15/25 1013   Resp 16 01/15/25 1013   SpO2 100 % 01/15/25 1013   Vitals shown include unfiled device data.    Electronically Signed By: ZACHARY Amezcua CRNA  January 15, 2025  10:13 AM   ----- Message from Alberto Avila MD sent at 3/2/2021  9:49 AM CST -----  Results reviewed.  Please inform patient severe iron deficiency anemia - with hematochezia - needs to follow up with GI  She should start iron - Slow Fe per otc instructions given her t

## 2025-01-17 ENCOUNTER — OFFICE VISIT (OUTPATIENT)
Dept: PHYSICAL THERAPY | Age: 45
End: 2025-01-17
Payer: COMMERCIAL

## 2025-01-17 PROCEDURE — 97112 NEUROMUSCULAR REEDUCATION: CPT | Performed by: PHYSICAL THERAPIST

## 2025-01-17 PROCEDURE — 97110 THERAPEUTIC EXERCISES: CPT | Performed by: PHYSICAL THERAPIST

## 2025-01-17 NOTE — PROGRESS NOTES
Diagnosis:   High-tone pelvic floor dysfunction in female (M62.89)        Referring Provider: Physician Nonstaff  Date of Evaluation:    11/15/2024    Precautions:  None Next MD visit:   none scheduled  Date of Surgery: n/a   Insurance Primary/Secondary: BCBS OUT OF STATE / N/A     # Auth Visits: 20 visit limit            Subjective: Pt reports a little bit of bright red blood with wiping but was only once. Otherwise doing ok    Pain: 0/10      Objective: See Flowsheet for details  1/10/2025  Informed verbal consent for internal pelvic evaluation given: Yes, consent obtained with frequent check-ins throughout    External Observation:   Voluntary contraction: present   Voluntary relaxation: present  Involuntary relaxation: present upon cueing    Mons pubis: WNL  Labia majora: WNL  Labia minora: WNL  Urethral meatus: WNL  Introitus: WNL  Perineal body: WNL  +external hemorrhoids    Sensory/Reflex:  Vestibule: normal bilaterally    Internal Palpation: WNL      Assessment: Emphasized progressions moving towards discharge at next visit. Provided pt with options/ modifications with goal of engaging abdominals & glutes. Instructed in how to know pt is properly activating lower abdominals to prevent doming, straining, or bearing down.       Goals: (to be met in 10 visits)  Patient will report consumption of at least 25 grams of daily fiber and 64 ounces of water for improved stool consistency. -progressing towards  Patient will demonstrate optimal voiding mechanics and breath regulation with bowel movements for improved bowel emptying without straining, for reduced risk of POP, with avoiding sitting > 8' without active BM. -progressing towards  Patient will report Marinoff Scale 0-1 & will demonstrate PFM lengthening WNL with coordinated diaphragmatic breathing x 10 reps to alleviate pain and muscle tension during intercourse. -progressing towards  Patient will report pain at worst 4/10 with prolonged sitting during work  day. -met  Patient will demonstrate B HS & piriformis muscle length WNL to alleviate tension in support structures of pelvic floor musculature.  Patient will report adherence to HEP for continued exercise benefits following cessation of PT.    Plan: Anticipate discharge to HEP next visit.  Date: 12/6/2024  TX#: 4/10 Date: 12/16/2024  TX#: 5/10 Date: 12/27/2024  TX#: 6/10 Date: 1/3/2025  TX#: 7/10 Date: 1/10/2025  TX#: 8/10 Date: 1/17/2025  TX#: 9/10    Neuro Re-ed: 20'  TA bracing (with education in connection to PFD):  -with breathing coordination in hooklying  Seated PFM lengthening with two folded towels  Dyspareunia- positions  Neuro Re-ed: 6'  MET techniques to correct pelvic obliquity Neuro Re-ed: 15'  Internal PFM re-check:  -coordinating PFM with breathing: PFM lengthening/ relaxation concurrent with PT cueing  -pt encouraged to cont to work on this  Education: void before intercourse to test if any UI is noted with attempt - ensure empty bladder Neuro Re-ed: 14'  Promotion of PFM lengthening: positions: gravity assist, seated with two folded towels  TA bracing with:  -supine march L1->progress to tabletop alt tap, DL tap. Ways to know pt should stop or back off: breathe holding, bracing outwards vs drawing in   Manual Therapy: 12'  Jt mobs: lower thoracic & lumbar central PA gr II-IV  STM: R/L T/L PSM  Lumbar myofascial stretch Manual Therapy: 25'  STM: B glutes/ piriformis (over clothing in R/L sidelying) Manual Therapy: 35'  MFR: R/L external OI (gloved, over clothing), gloved- abdominal cross-hand release, pelvic diaphragm release, diaphragm release R/L Manual Therapy: 24'  Jt mobs: lumbosacral central PA gr II-IV  STM L glutes, piriformis         Self-Care: 10'  Check diet: fiber, water for stool consistency      Therex: 29'  Lumbar AROM assess  Prone hip IR stretch 10 X 10\" - HEP  TAISHA 10 X 10\" - HEP  Prone over 1 pillow: hip ext raises X 15 ea - HEP  S/L hip Abd X 15 ea - HEP  Standing sport cord shld  ext 2 X 15 green  Standing sport cord row 2 X 15 blue  Standing sport cord paloff press 2 X 15 ea double blue    Therex: 8'  Standing doorway QL stretch trial  1/2 kneel hip flexor stretch with SB trial  Deep squat stretch (no wall) trial Therex: 10'  Modified pigeon stretch with back leg bent - trial  -with rotation: post chain stretch  Modified frog stretch - trial Therex: 14'  Discharge planning  Education: Heating pad to abdomen/ pelvis Therex: 24'  Discharge planning  Standing hip Abd & Ext with Blue cuff loop X 20 ea - trial without shoes for increased intrinsic arch control  CW 4 X 20 ft cuff loop  MW fwd-bwd 4 X 20 ft Blue cuff loop  Progression options/ form check:  Rows & shld ext on BOSU flat side down (gray sport cord)  Rows in SLB - gray  Woodchops DL->SL - gray (double)  Trunk rot with gray sport cord (double): DL->SL  Quadruped alt UE/ LE raise  Fire hydrant  Quadruped multifidi raise   HEP: IE: belly hard belly big with squatty potty/ stepstool; colon massage (ILU massage); avoid sitting > 8 min while having BM  Access Code: [email or web address]  URL: [email or web address]  Date: 11/22/2024  Prepared by: Tawana Mcmahon    Exercises  - Pelvic Floor Lengthening in Hooklying  - 1 x daily - 7 x weekly - 2-3 min duration  - Happy Baby with Pelvic Floor Lengthening  - 1 x daily - 7 x weekly - 2-3 min hold  - Seated Pelvic Floor Lengthening  - 1 x daily - 7 x weekly - 2-3 min duration  - Supine Diaphragmatic Breathing with Pelvic Floor Lengthening  - 1 x daily - 7 x weekly - 2-3 min duration    Access Code: [email or web address]  URL: [email or web address]  Date: 11/27/2024  Prepared by: Tawana Mcmahon    Exercises  - Supine Butterfly Groin Stretch  - 1 x daily - 7 x weekly - 3 sets - 30 sec hold  - Hooklying Active Hamstring Stretch  - 1 x daily - 7 x weekly - 1 sets - 10 reps - 10 sec hold  - Seated Figure 4 Piriformis Stretch  - 1 x daily - 7 x weekly - 3 sets - 30 sec hold  - Supine Figure 4  Piriformis Stretch  - 1 x daily - 7 x weekly - 3 sets - 30 sec hold  - Cat Cow  - 1 x daily - 7 x weekly - 2 sets - 10 reps - 2-3 sec hold  - Child's Pose Knees Apart and Hands Forward   - 1 x daily - 7 x weekly - 3 sets - 30 sec hold  - Tail Wag  - 1 x daily - 7 x weekly - 2 sets - 10 reps - 2-3 sec hold  - Sidelying Open Book Thoracic Lumbar Rotation and Extension  - 1 x daily - 7 x weekly - 3 sets - 10 reps - 5 sec hold  - Quadruped Full Range Thoracic Rotation with Reach  - 1 x daily - 7 x weekly - 1 sets - 10 reps - 5 sec hold  - Standing 'L' Stretch at Counter  - 1 x daily - 7 x weekly - 3 sets - 30 sec hold  - Kneeling Adductor Stretch with Hip External Rotation  - 1 x daily - 7 x weekly - 3 sets - 30 sec hold  - Deep Squat with Pelvic Floor Relaxation  - 1 x daily - 7 x weekly - 2-3 min duration    Access Code: [email or web address]  URL: [email or web address]  Date: 12/06/2024  Prepared by: Tawana Mcmahon    Exercises  - Static Prone on Elbows  - 1 x daily - 7 x weekly - 1 sets - 10 reps - 10 sec hold  - Prone Hip Extension with Pillow Under Abdomen  - 1 x daily - 4 x weekly - 2 sets - 15 reps  - Sidelying Hip Abduction  - 1 x daily - 4 x weekly - 2 sets - 15 reps  - Prone Hip Internal Rotation AROM  - 1 x daily - 7 x weekly - 1-2 sets - 10 reps - 10 sec hold    Access Code: [email or web address]  URL: [email or web address]  Date: 01/03/2025  Prepared by: Tawana Salome    Exercises  - Quadruped Adductor Stretch  - 1 x daily - 7 x weekly - 3 sets - 30 sec hold  - [place] Pose  - 1 x daily - 7 x weekly - 3 sets - 30 sec hold  - Posterior Chain Stretch  - 1 x daily - 7 x weekly - 3 sets - 30 sec hold    Charges: Neuro X 1, Therex X 2       Total Timed Treatment: 38 min  Total Treatment Time: 38 min

## 2025-01-24 ENCOUNTER — OFFICE VISIT (OUTPATIENT)
Dept: PHYSICAL THERAPY | Age: 45
End: 2025-01-24
Payer: COMMERCIAL

## 2025-01-24 ENCOUNTER — TELEPHONE (OUTPATIENT)
Dept: PHYSICAL THERAPY | Facility: HOSPITAL | Age: 45
End: 2025-01-24

## 2025-01-24 PROCEDURE — 97110 THERAPEUTIC EXERCISES: CPT | Performed by: PHYSICAL THERAPIST

## 2025-01-24 NOTE — PROGRESS NOTES
Diagnosis:   High-tone pelvic floor dysfunction in female (M62.89)        Referring Provider: Physician Nonstaff  Date of Evaluation:    11/15/2024    Precautions:  None Next MD visit: 2/17/25 surgeon for possible scar tissue: colorectal clinic [facility] medicine  Date of Surgery: n/a   Insurance Primary/Secondary: BCBS OUT OF STATE / N/A     # Auth Visits: 20 visit limit             Discharge Summary  Pt has attended 10 visits in Physical Therapy.     Subjective: Pt reports a bit more bleeding this week which lasted a day. Some issue with feeling she is not fully emptying bowels. Otherwise no pain. Cont with rare low back stiffness though less.   75 % improvement    URINARY HABITS  Types of symptoms: other: none     BOWEL HABITS  Types of symptoms: Constipation; needs to sit a while to empty. Can sit for 20 min (had improved though reports a bit regressed in the last week)  Frequency of bowel movements: ~ 1x/ day  Stool consistency: [place] Stool Scale: 4-5 usually  Do you strain with defecation: No/ tries to avoid    SEXUAL HEALTH STATUS  Marinoff Scale: 0  Sexual Pulpotio Bareas Status: active  Pain with initial and/or deep penetration: not recently      Pain: 0/10      Objective: See Flowsheet for details  1/24/2025  Pt declined need for internal PFM re-check today.    PFDI 20    Scores   POPDI 6:    29.17   CRAD 8:    46.88   LOUANN 6:    29.17   Summary:    105.22      Flexibility Summary: HS L min restricted, R WNL; piriformis B WNL      1/10/2025  Informed verbal consent for internal pelvic evaluation given: Yes, consent obtained with frequent check-ins throughout    External Observation:   Voluntary contraction: present   Voluntary relaxation: present  Involuntary relaxation: present upon cueing    Mons pubis: WNL  Labia majora: WNL  Labia minora: WNL  Urethral meatus: WNL  Introitus: WNL  Perineal body: WNL  +external hemorrhoids    Sensory/Reflex:  Vestibule: normal bilaterally    Internal Palpation:  WNL      Assessment: Pt is appropriate to transition to HEP & reports readiness for discharge. Pt verbalizes understanding w/ HEP & ability to self-alter as appropriate. Pt advised to contact PT if questions/ concerns arise while performing HEP. Pt advised to follow up with referring provider and/ or PCP if symptoms increase despite adherence to HEP. Pt is aware that if symptoms recur or increase, pt may be appropriate to return to skilled PT under new POC w/ updated RX if deemed medically necessary. Pt is in agreement with discharge plans. Thank you.    Goals: (to be met in 10 visits)  Patient will report consumption of at least 25 grams of daily fiber and 64 ounces of water for improved stool consistency. -met  Patient will demonstrate optimal voiding mechanics and breath regulation with bowel movements for improved bowel emptying without straining, for reduced risk of POP, with avoiding sitting > 8' without active BM. -almost met  Patient will report Marinoff Scale 0-1 & will demonstrate PFM lengthening WNL with coordinated diaphragmatic breathing x 10 reps to alleviate pain and muscle tension during intercourse. -almost met  Patient will report pain at worst 4/10 with prolonged sitting during work day. -met  Patient will demonstrate B HS & piriformis muscle length WNL to alleviate tension in support structures of pelvic floor musculature. -almost met  Patient will report adherence to HEP for continued exercise benefits following cessation of PT. -met    Plan: Discharge to HEP.    Patient/Family/Caregiver was advised of these findings, precautions, and treatment options and has agreed to actively participate in planning and for this course of care.    Thank you for your referral. If you have any questions, please contact me at Dept: [phone].    Sincerely,  Electronically signed by therapist: Tawana Mcmahon, PT     Physician's certification required:  No  Please co-sign or sign and return this letter via fax as  soon as possible to [phone].   I certify the need for these services furnished under this plan of treatment and while under my care.    X___________________________________________________ Date____________________    Certification From: 1/24/2025  To:4/24/2025     Date: 12/6/2024  TX#: 4/10 Date: 12/16/2024  TX#: 5/10 Date: 12/27/2024  TX#: 6/10 Date: 1/3/2025  TX#: 7/10 Date: 1/10/2025  TX#: 8/10 Date: 1/17/2025  TX#: 9/10 Date: 1/24/2025  TX#: 10/10  Discharge    Neuro Re-ed: 20'  TA bracing (with education in connection to PFD):  -with breathing coordination in hooklying  Seated PFM lengthening with two folded towels  Dyspareunia- positions  Neuro Re-ed: 6'  MET techniques to correct pelvic obliquity Neuro Re-ed: 15'  Internal PFM re-check:  -coordinating PFM with breathing: PFM lengthening/ relaxation concurrent with PT cueing  -pt encouraged to cont to work on this  Education: void before intercourse to test if any UI is noted with attempt - ensure empty bladder Neuro Re-ed: 14'  Promotion of PFM lengthening: positions: gravity assist, seated with two folded towels  TA bracing with:  -supine march L1->progress to tabletop alt tap, DL tap. Ways to know pt should stop or back off: breathe holding, bracing outwards vs drawing in    Manual Therapy: 12'  Jt mobs: lower thoracic & lumbar central PA gr II-IV  STM: R/L T/L PSM  Lumbar myofascial stretch Manual Therapy: 25'  STM: B glutes/ piriformis (over clothing in R/L sidelying) Manual Therapy: 35'  MFR: R/L external OI (gloved, over clothing), gloved- abdominal cross-hand release, pelvic diaphragm release, diaphragm release R/L Manual Therapy: 24'  Jt mobs: lumbosacral central PA gr II-IV  STM L glutes, piriformis          Self-Care: 10'  Check diet: fiber, water for stool consistency       Therex: 29'  Lumbar AROM assess  Prone hip IR stretch 10 X 10\" - HEP  TAISHA 10 X 10\" - HEP  Prone over 1 pillow: hip ext raises X 15 ea - HEP  S/L hip Abd X 15 ea -  HEP  Standing sport cord shld ext 2 X 15 green  Standing sport cord row 2 X 15 blue  Standing sport cord paloff press 2 X 15 ea double blue    Therex: 8'  Standing doorway QL stretch trial  1/2 kneel hip flexor stretch with SB trial  Deep squat stretch (no wall) trial Therex: 10'  Modified pigeon stretch with back leg bent - trial  -with rotation: post chain stretch  Modified frog stretch - trial Therex: 14'  Discharge planning  Education: Heating pad to abdomen/ pelvis Therex: 24'  Discharge planning  Standing hip Abd & Ext with Blue cuff loop X 20 ea - trial without shoes for increased intrinsic arch control  CW 4 X 20 ft cuff loop  MW fwd-bwd 4 X 20 ft Blue cuff loop  Progression options/ form check:  Rows & shld ext on BOSU flat side down (gray sport cord)  Rows in SLB - gray  Woodchops DL->SL - gray (double)  Trunk rot with gray sport cord (double): DL->SL  Quadruped alt UE/ LE raise  Fire hydrant  Quadruped multifidi raise Therex: 38'  Discharge instructions  HEP review  -Review stretches: seated piriformis, seated HS, open the book, thread the needle, deep squat, prone hip IR. Breathe through the stretches  -ensure proper abdominal bracing  -pelvic wand in the future? Not needed now  Re-assessment/ goal status completion  Self-MFR piriformis with tennis ball at wall- instruction for HEP with education in proper form. Advise standing vs performing in supine or sitting.   HEP: IE: belly hard belly big with squatty potty/ stepstool; colon massage (ILU massage); avoid sitting > 8 min while having BM  Access Code: [email or web address]  URL: [email or web address]  Date: 11/22/2024  Prepared by: Tawana Mcmahon    Exercises  - Pelvic Floor Lengthening in Hooklying  - 1 x daily - 7 x weekly - 2-3 min duration  - Happy Baby with Pelvic Floor Lengthening  - 1 x daily - 7 x weekly - 2-3 min hold  - Seated Pelvic Floor Lengthening  - 1 x daily - 7 x weekly - 2-3 min duration  - Supine Diaphragmatic Breathing with  Pelvic Floor Lengthening  - 1 x daily - 7 x weekly - 2-3 min duration    Access Code: [email or web address]  URL: [email or web address]  Date: 11/27/2024  Prepared by: Tawana Mcmahon    Exercises  - Supine Butterfly Groin Stretch  - 1 x daily - 7 x weekly - 3 sets - 30 sec hold  - Hooklying Active Hamstring Stretch  - 1 x daily - 7 x weekly - 1 sets - 10 reps - 10 sec hold  - Seated Figure 4 Piriformis Stretch  - 1 x daily - 7 x weekly - 3 sets - 30 sec hold  - Supine Figure 4 Piriformis Stretch  - 1 x daily - 7 x weekly - 3 sets - 30 sec hold  - Cat Cow  - 1 x daily - 7 x weekly - 2 sets - 10 reps - 2-3 sec hold  - Child's Pose Knees Apart and Hands Forward   - 1 x daily - 7 x weekly - 3 sets - 30 sec hold  - Tail Wag  - 1 x daily - 7 x weekly - 2 sets - 10 reps - 2-3 sec hold  - Sidelying Open Book Thoracic Lumbar Rotation and Extension  - 1 x daily - 7 x weekly - 3 sets - 10 reps - 5 sec hold  - Quadruped Full Range Thoracic Rotation with Reach  - 1 x daily - 7 x weekly - 1 sets - 10 reps - 5 sec hold  - Standing 'L' Stretch at Counter  - 1 x daily - 7 x weekly - 3 sets - 30 sec hold  - Kneeling Adductor Stretch with Hip External Rotation  - 1 x daily - 7 x weekly - 3 sets - 30 sec hold  - Deep Squat with Pelvic Floor Relaxation  - 1 x daily - 7 x weekly - 2-3 min duration    Access Code: [email or web address]  URL: [email or web address]  Date: 12/06/2024  Prepared by: Tawana Mcmahon    Exercises  - Static Prone on Elbows  - 1 x daily - 7 x weekly - 1 sets - 10 reps - 10 sec hold  - Prone Hip Extension with Pillow Under Abdomen  - 1 x daily - 4 x weekly - 2 sets - 15 reps  - Sidelying Hip Abduction  - 1 x daily - 4 x weekly - 2 sets - 15 reps  - Prone Hip Internal Rotation AROM  - 1 x daily - 7 x weekly - 1-2 sets - 10 reps - 10 sec hold    Access Code: [email or web address]  URL: [email or web address]  Date: 01/03/2025  Prepared by: Tawana Mcmahon    Exercises  - Quadruped Adductor Stretch  - 1 x daily - 7  x weekly - 3 sets - 30 sec hold  - [place] Pose  - 1 x daily - 7 x weekly - 3 sets - 30 sec hold  - Posterior Chain Stretch  - 1 x daily - 7 x weekly - 3 sets - 30 sec hold    Charges: Therex X 3       Total Timed Treatment: 38 min  Total Treatment Time: 38 min

## 2025-01-31 ENCOUNTER — APPOINTMENT (OUTPATIENT)
Dept: PHYSICAL THERAPY | Age: 45
End: 2025-01-31
Payer: COMMERCIAL

## 2025-05-13 ENCOUNTER — TELEPHONE (OUTPATIENT)
Dept: FAMILY MEDICINE CLINIC | Facility: CLINIC | Age: 45
End: 2025-05-13

## 2025-05-13 DIAGNOSIS — Z00.00 ROUTINE GENERAL MEDICAL EXAMINATION AT A HEALTH CARE FACILITY: Primary | ICD-10-CM

## 2025-05-16 ENCOUNTER — LAB ENCOUNTER (OUTPATIENT)
Dept: LAB | Age: 45
End: 2025-05-16
Attending: FAMILY MEDICINE
Payer: COMMERCIAL

## 2025-05-16 DIAGNOSIS — Z00.00 ROUTINE GENERAL MEDICAL EXAMINATION AT A HEALTH CARE FACILITY: ICD-10-CM

## 2025-05-16 LAB
ALBUMIN SERPL-MCNC: 4.6 G/DL (ref 3.2–4.8)
ALBUMIN/GLOB SERPL: 1.9 {RATIO} (ref 1–2)
ALP LIVER SERPL-CCNC: 37 U/L (ref 39–100)
ALT SERPL-CCNC: 16 U/L (ref 10–49)
ANION GAP SERPL CALC-SCNC: 10 MMOL/L (ref 0–18)
AST SERPL-CCNC: 23 U/L (ref ?–34)
BASOPHILS # BLD AUTO: 0.03 X10(3) UL (ref 0–0.2)
BASOPHILS NFR BLD AUTO: 0.6 %
BILIRUB SERPL-MCNC: 0.6 MG/DL (ref 0.3–1.2)
BUN BLD-MCNC: 13 MG/DL (ref 9–23)
BUN/CREAT SERPL: 11.1 (ref 10–20)
CALCIUM BLD-MCNC: 9 MG/DL (ref 8.7–10.4)
CHLORIDE SERPL-SCNC: 101 MMOL/L (ref 98–112)
CHOLEST SERPL-MCNC: 200 MG/DL (ref ?–200)
CO2 SERPL-SCNC: 25 MMOL/L (ref 21–32)
CREAT BLD-MCNC: 1.17 MG/DL (ref 0.55–1.02)
DEPRECATED RDW RBC AUTO: 39.2 FL (ref 35.1–46.3)
EGFRCR SERPLBLD CKD-EPI 2021: 58 ML/MIN/1.73M2 (ref 60–?)
EOSINOPHIL # BLD AUTO: 0.1 X10(3) UL (ref 0–0.7)
EOSINOPHIL NFR BLD AUTO: 1.9 %
ERYTHROCYTE [DISTWIDTH] IN BLOOD BY AUTOMATED COUNT: 11.5 % (ref 11–15)
FASTING PATIENT LIPID ANSWER: YES
FASTING STATUS PATIENT QL REPORTED: YES
GLOBULIN PLAS-MCNC: 2.4 G/DL (ref 2–3.5)
GLUCOSE BLD-MCNC: 78 MG/DL (ref 70–99)
HCT VFR BLD AUTO: 39.1 % (ref 35–48)
HDLC SERPL-MCNC: 91 MG/DL (ref 40–59)
HGB BLD-MCNC: 13.4 G/DL (ref 12–16)
IMM GRANULOCYTES # BLD AUTO: 0.01 X10(3) UL (ref 0–1)
IMM GRANULOCYTES NFR BLD: 0.2 %
LDLC SERPL CALC-MCNC: 97 MG/DL (ref ?–100)
LYMPHOCYTES # BLD AUTO: 1.97 X10(3) UL (ref 1–4)
LYMPHOCYTES NFR BLD AUTO: 36.6 %
MCH RBC QN AUTO: 32.1 PG (ref 26–34)
MCHC RBC AUTO-ENTMCNC: 34.3 G/DL (ref 31–37)
MCV RBC AUTO: 93.8 FL (ref 80–100)
MONOCYTES # BLD AUTO: 0.42 X10(3) UL (ref 0.1–1)
MONOCYTES NFR BLD AUTO: 7.8 %
NEUTROPHILS # BLD AUTO: 2.85 X10 (3) UL (ref 1.5–7.7)
NEUTROPHILS # BLD AUTO: 2.85 X10(3) UL (ref 1.5–7.7)
NEUTROPHILS NFR BLD AUTO: 52.9 %
NONHDLC SERPL-MCNC: 109 MG/DL (ref ?–130)
OSMOLALITY SERPL CALC.SUM OF ELEC: 281 MOSM/KG (ref 275–295)
PLATELET # BLD AUTO: 287 10(3)UL (ref 150–450)
POTASSIUM SERPL-SCNC: 4 MMOL/L (ref 3.5–5.1)
PROT SERPL-MCNC: 7 G/DL (ref 5.7–8.2)
RBC # BLD AUTO: 4.17 X10(6)UL (ref 3.8–5.3)
SODIUM SERPL-SCNC: 136 MMOL/L (ref 136–145)
TRIGL SERPL-MCNC: 66 MG/DL (ref 30–149)
TSI SER-ACNC: 1.22 UIU/ML (ref 0.55–4.78)
VLDLC SERPL CALC-MCNC: 11 MG/DL (ref 0–30)
WBC # BLD AUTO: 5.4 X10(3) UL (ref 4–11)

## 2025-05-16 PROCEDURE — 80061 LIPID PANEL: CPT | Performed by: FAMILY MEDICINE

## 2025-05-16 PROCEDURE — 80050 GENERAL HEALTH PANEL: CPT | Performed by: FAMILY MEDICINE

## 2025-05-23 ENCOUNTER — OFFICE VISIT (OUTPATIENT)
Dept: FAMILY MEDICINE CLINIC | Facility: CLINIC | Age: 45
End: 2025-05-23
Payer: COMMERCIAL

## 2025-05-23 VITALS
WEIGHT: 157 LBS | DIASTOLIC BLOOD PRESSURE: 70 MMHG | SYSTOLIC BLOOD PRESSURE: 110 MMHG | HEIGHT: 67 IN | BODY MASS INDEX: 24.64 KG/M2 | OXYGEN SATURATION: 98 % | HEART RATE: 78 BPM

## 2025-05-23 DIAGNOSIS — Z12.31 ENCOUNTER FOR SCREENING MAMMOGRAM FOR MALIGNANT NEOPLASM OF BREAST: ICD-10-CM

## 2025-05-23 DIAGNOSIS — K64.8 HEMORRHOIDS WITH COMPLICATION: ICD-10-CM

## 2025-05-23 DIAGNOSIS — Z00.00 ROUTINE PHYSICAL EXAMINATION: Primary | ICD-10-CM

## 2025-05-23 DIAGNOSIS — R92.333 HETEROGENEOUSLY DENSE TISSUE OF BOTH BREASTS ON MAMMOGRAPHY: ICD-10-CM

## 2025-05-23 DIAGNOSIS — K58.2 IRRITABLE BOWEL SYNDROME WITH BOTH CONSTIPATION AND DIARRHEA: ICD-10-CM

## 2025-05-23 DIAGNOSIS — L81.9 HYPERPIGMENTED SKIN LESION: ICD-10-CM

## 2025-05-23 NOTE — PATIENT INSTRUCTIONS
Future diagnostics      [address] [place] [id number]  We are conveniently located 5 [place] [address] one block west of [facility].    [place] HOURS are:  Monday - Tuesday 7:00 am - 6:30 pm  Wednesday - Friday 7:00 am - 5:00 pm  Saturdays may vary    [address], [place] [address]  We are conveniently located off of [address] and [address] on the [facility].     [place] HOURS are:  Monday - Tuesday 7:00 am - 5:00 pm  Wednesday - Friday 9:00 am - 5:00 pm      [facility]  Address: [address] [facility], [place] [address]  Phone: [phone]      Dermatology   [place]  Dr. Cari Beal MD  [email or web address]    Hilda Matt MD  [address] [place] [address]  [address]  [place], [place] [address]    [facility] Dermatology

## 2025-05-23 NOTE — PROGRESS NOTES
Mariama Haines is a 44 year old female.    CC:    Chief Complaint   Patient presents with    Physical     Reviewed Preventative/Wellness form with patient.  Physical and blood work review        HPI:  Wellness     Exercise: Yes  Drinks water: Yes  Eat variety of fruits & vegetables, lean meats: Yes  Issues with sleep: No  Regular dental exams: Yes  Change in size/consistency of stool: No  Change in appearance of mole: Yes - new lesion anterior chest    Gyne Hx  OB History    Para Term  AB Living   0 0 0 0 0 0   SAB IAB Ectopic Multiple Live Births   0 0 0 0 0     Patient's last menstrual period was 2025.  Period: regular       CAGE Alcohol Screening       2025    11:23 AM   CAGE Flowsheet   Have you ever felt you should Cut down on your drinking? 0   Have people Annoyed you by criticizing your drinking? 0   Have you ever felt bad or Guilty about your drinking? 0   Have you ever had a drink first thing in the morning to steady your nerves or to get rid of a hangover (Eye opener)? 0   Total Score: Item responses on the CAGE are scored 0 or 1, with a higher score an indication of alcohol 0   Total Score: Item responses on the CAGE are scored 0 or 1, with a higher score an indication of alcohol No Risk        Depression Screening (PHQ-2/PHQ-9): Over the LAST 2 WEEKS   Little interest or pleasure in doing things: Not at all    Feeling down, depressed, or hopeless: Not at all    PHQ-2 SCORE: 0          [place] Suicide Severity Rating Scale Screener   In what setting is the screener performed?: an office visit  1. Have you wished you were dead or wished you could go to sleep and not wake up? (past 30 days): No  2. Have you actually had any thoughts of killing yourself? (past 30 days): No  6. Have you ever done anything, started to do anything, or prepared to do anything to end your life? (lifetime): No  Score and Suggested Actions - Office Visit: No Risk  Score and Intervention  Score  and Suggested Actions - Office Visit: No Risk    Also wants to discuss:     Follow up     Would like to lose weight   Some numbness/tingling of fingers - mostly positional   Occasional headaches,   No vision changes, polydipsia, polyuria           Allergies:  Allergies[1]   Current Meds:  Medications Ordered Prior to Encounter[2]     History:  Past Medical History[3]   Past Surgical History[4]   Family History[5]   Family Status   Relation Status    Mo Alive    Fa Alive    MGMA     MGFA     PGMA     PGFA       Short Social Hx on File[6]         Immunization History   Administered Date(s) Administered    Covid-19 Vaccine Pfizer 30 mcg/0.3 ml 2021, 2021, 2021    TDAP 2020       Wt Readings from Last 6 Encounters:   25 157 lb (71.2 kg)   24 158 lb (71.7 kg)   24 157 lb (71.2 kg)   24 160 lb (72.6 kg)   23 154 lb (69.9 kg)   22 155 lb (70.3 kg)       BP Readings from Last 3 Encounters:   25 110/70   24 102/72   24 108/62       REVIEW OF SYSTEMS:   Review of Systems   Constitutional:  Negative for chills, fever and malaise/fatigue.   HENT:  Negative for congestion, ear pain and sore throat.    Eyes:  Negative for blurred vision, double vision and pain.   Respiratory:  Negative for cough, shortness of breath and wheezing.    Cardiovascular:  Negative for chest pain, palpitations and leg swelling.   Gastrointestinal:  Positive for constipation. Negative for abdominal pain, blood in stool, diarrhea, melena, nausea and vomiting.   Genitourinary:  Negative for dysuria, flank pain and frequency.   Musculoskeletal:  Negative for back pain, joint pain and myalgias.   Skin:  Negative for itching and rash.   Neurological:  Negative for dizziness, weakness and headaches.   Endo/Heme/Allergies:  Negative for environmental allergies and polydipsia. Does not bruise/bleed easily.   Psychiatric/Behavioral:  Negative for  depression. The patient is not nervous/anxious and does not have insomnia.        EXAM:   /70   Pulse 78   Ht 5' 7\" (1.702 m)   Wt 157 lb (71.2 kg)   LMP 05/18/2025   SpO2 98%   BMI 24.59 kg/m²   Body mass index is 24.59 kg/m².  Patient's last menstrual period was 05/18/2025.    Physical Exam  Constitutional:       General: She is not in acute distress.     Appearance: Normal appearance.   HENT:      Right Ear: Tympanic membrane normal.      Left Ear: Tympanic membrane normal.      Mouth/Throat:      Mouth: Mucous membranes are moist.      Pharynx: Oropharynx is clear. No posterior oropharyngeal erythema.   Eyes:      Extraocular Movements: Extraocular movements intact.      Conjunctiva/sclera: Conjunctivae normal.      Pupils: Pupils are equal, round, and reactive to light.   Cardiovascular:      Rate and Rhythm: Normal rate and regular rhythm.      Pulses: Normal pulses.      Heart sounds: Normal heart sounds.   Pulmonary:      Effort: Pulmonary effort is normal.      Breath sounds: Normal breath sounds. No wheezing or rhonchi.   Abdominal:      General: Abdomen is flat. Bowel sounds are normal.      Palpations: Abdomen is soft.      Tenderness: There is no abdominal tenderness. There is no guarding.   Musculoskeletal:         General: No swelling, tenderness, deformity or signs of injury.      Cervical back: Neck supple.   Lymphadenopathy:      Cervical: No cervical adenopathy.   Skin:     General: Skin is warm and dry.      Findings: No rash.   Neurological:      General: No focal deficit present.      Mental Status: She is alert and oriented to person, place, and time.      Motor: No weakness.   Psychiatric:         Mood and Affect: Mood normal.         Behavior: Behavior normal.         Thought Content: Thought content normal.                ASSESSMENT/PLAN:      1. Routine physical examination  Normal labs    2. Encounter for screening mammogram for malignant neoplasm of breast  - [facility] MIKAELA 2D+3D  SCREENING BILAT (CPT=77067/27650); Future    3. Irritable bowel syndrome with both constipation and diarrhea    4. Hemorrhoids with complication    Increase fiber compliance   Increase pelvic floor exercises      5. Hyperpigmented skin lesion   Schedule routine skin check with derm      Health Maintenance   Topic Date Due    Mammogram  02/01/2020    COVID-19 Vaccine (4 - 2024-25 season) 09/01/2024    Annual Physical  04/26/2025    Influenza Vaccine (Season Ended) 10/01/2025    Pap Smear  04/26/2027    DTaP,Tdap,and Td Vaccines (2 - Td or Tdap) 03/18/2030    Colorectal Cancer Screening  03/24/2031    Annual Depression Screening  Completed    Pneumococcal Vaccine: Birth to 50yrs  Aged Out    Meningococcal B Vaccine  Aged Out         Healthy diet and regular exercise discussed     Meds & Refills for this Visit:  Requested Prescriptions      No prescriptions requested or ordered in this encounter         Imaging & Consults:  [facility] MIKAELA 2D+3D SCREENING BILAT (CPT=77067/29742)  US BREAST BILATERAL COMPLETE (CPT=76641-50)    Return in about 1 year (around 5/23/2026) for annual physical.             [1]   Allergies  Allergen Reactions    Doxycycline RASH   [2]   Current Outpatient Medications on File Prior to Visit   Medication Sig Dispense Refill    Norgestim-Eth Estrad Triphasic 0.18/0.215/0.25 MG-35 MCG Oral Tab Take 1 tablet by mouth daily. 84 tablet 3    Ferrous Sulfate ER (SLOW FE) 142 (45 Fe) MG Oral Tab CR       Vitamin D-Vitamin K (VITAMIN K2-VITAMIN D3 OR)       Multiple Vitamin (MULTIVITAMIN ADULT OR) Take by mouth.      PROBIOTIC PRODUCT OR Take by mouth.      NORGESTIM-ETH ESTRAD TRIPHASIC 0.18/0.215/0.25 MG-35 MCG Oral Tab TAKE 1 TABLET BY MOUTH EVERY DAY 84 tablet 0    FLUoxetine 10 MG Oral Cap Take 1 capsule (10 mg total) by mouth daily. 90 capsule 1     No current facility-administered medications on file prior to visit.   [3]   Past Medical History:   Abdominal pain    Anemia    Bloating    Blood in the  stool    Body piercing    Change in hair    Nails brittle    Decorative tattoo    Depression    Diarrhea, unspecified    Easy bruising    Always noticed that I bruise easier than others    Excessive bleeding    Fatigue    Flatulence/gas pain/belching    Frequent UTI    Headache disorder    Hemorrhoids    Irritable bowel syndrome    Leg swelling    Rash    eczema by left ear   [4]   Past Surgical History:  Procedure Laterality Date    Colonoscopy  May 2016   [5]   Family History  Problem Relation Age of Onset    Dementia Maternal Grandmother     Diabetes Maternal Grandmother     Stroke Maternal Grandfather     Heart Disease Maternal Grandfather    [6]   Social History  Socioeconomic History    Marital status:    Tobacco Use    Smoking status: Never    Smokeless tobacco: Never   Vaping Use    Vaping status: Never Used   Substance and Sexual Activity    Alcohol use: Yes     Alcohol/week: 3.0 standard drinks of alcohol     Types: 3 Standard drinks or equivalent per week    Drug use: No   Other Topics Concern    Caffeine Concern No    Exercise No    Seat Belt No    Special Diet No    Stress Concern No    Weight Concern No     Social Drivers of Health     Food Insecurity: No Food Insecurity (5/23/2025)    NCSS - Food Insecurity     Worried About Running Out of Food in the Last Year: No     Ran Out of Food in the Last Year: No   Transportation Needs: No Transportation Needs (5/23/2025)    NCSS - Transportation     Lack of Transportation: No   Housing Stability: Not At Risk (5/23/2025)    NCSS - Housing/Utilities     Has Housing: Yes     Worried About Losing Housing: No     Unable to Get Utilities: No

## 2025-05-23 NOTE — PROGRESS NOTES
The following individual(s) verbally consented to be recorded using ambient AI listening technology and understand that they can each withdraw their consent to this listening technology at any point by asking the clinician to turn off or pause the recording:    Patient name: Mariama Haines  Additional names:

## 2025-06-12 RX ORDER — NORGESTIMATE AND ETHINYL ESTRADIOL 7DAYSX3 28
1 KIT ORAL DAILY
Qty: 84 TABLET | Refills: 3 | Status: CANCELLED | OUTPATIENT
Start: 2025-06-12

## 2025-06-13 RX ORDER — NORGESTIMATE AND ETHINYL ESTRADIOL 7DAYSX3 28
1 KIT ORAL DAILY
Qty: 84 TABLET | Refills: 3 | Status: SHIPPED | OUTPATIENT
Start: 2025-06-13

## 2025-06-13 NOTE — TELEPHONE ENCOUNTER
Duplicate request    Gynecology Medication Protocol Failed 06/13/2025 08:20 AM   Protocol Details Mammogram in past 12 months

## (undated) NOTE — LETTER
Patient Name: Mariama Haines  YOB: 1980          MRN :  [id number]  Date:  1/24/2025  Referring Physician:  Physician Nonstaff    Discharge Summary  Pt has attended 10 visits in Physical Therapy.     Subjective: Pt reports a bit more bleeding this week which lasted a day. Some issue with feeling she is not fully emptying bowels. Otherwise no pain. Cont with rare low back stiffness though less.   75 % improvement    URINARY HABITS  Types of symptoms: other: none     BOWEL HABITS  Types of symptoms: Constipation; needs to sit a while to empty. Can sit for 20 min (had improved though reports a bit regressed in the last week)  Frequency of bowel movements: ~ 1x/ day  Stool consistency: [place] Stool Scale: 4-5 usually  Do you strain with defecation: No/ tries to avoid    SEXUAL HEALTH STATUS  Marinoff Scale: 0  Sexual [place] Status: active  Pain with initial and/or deep penetration: not recently      Pain: 0/10      Objective: See Flowsheet for details  1/24/2025  Pt declined need for internal PFM re-check today.    PFDI 20    Scores   POPDI 6:    29.17   CRAD 8:    46.88   LOUANN 6:    29.17   Summary:    105.22      Flexibility Summary: HS L min restricted, R WNL; piriformis B WNL      1/10/2025  Informed verbal consent for internal pelvic evaluation given: Yes, consent obtained with frequent check-ins throughout    External Observation:   Voluntary contraction: present   Voluntary relaxation: present  Involuntary relaxation: present upon cueing    Mons pubis: WNL  Labia majora: WNL  Labia minora: WNL  Urethral meatus: WNL  Introitus: WNL  Perineal body: WNL  +external hemorrhoids    Sensory/Reflex:  Vestibule: normal bilaterally    Internal Palpation: WNL      Assessment: Pt is appropriate to transition to HEP & reports readiness for discharge. Pt verbalizes understanding w/ HEP & ability to self-alter as appropriate. Pt advised to contact PT if questions/ concerns arise while performing HEP.  Pt advised to follow up with referring provider and/ or PCP if symptoms increase despite adherence to HEP. Pt is aware that if symptoms recur or increase, pt may be appropriate to return to skilled PT under new POC w/ updated RX if deemed medically necessary. Pt is in agreement with discharge plans. Thank you.    Goals: (to be met in 10 visits)  Patient will report consumption of at least 25 grams of daily fiber and 64 ounces of water for improved stool consistency. -met  Patient will demonstrate optimal voiding mechanics and breath regulation with bowel movements for improved bowel emptying without straining, for reduced risk of POP, with avoiding sitting > 8' without active BM. -almost met  Patient will report Marinoff Scale 0-1 & will demonstrate PFM lengthening WNL with coordinated diaphragmatic breathing x 10 reps to alleviate pain and muscle tension during intercourse. -almost met  Patient will report pain at worst 4/10 with prolonged sitting during work day. -met  Patient will demonstrate B HS & piriformis muscle length WNL to alleviate tension in support structures of pelvic floor musculature. -almost met  Patient will report adherence to HEP for continued exercise benefits following cessation of PT. -met    Plan: Discharge to HEP.    Patient/Family/Caregiver was advised of these findings, precautions, and treatment options and has agreed to actively participate in planning and for this course of care.    Thank you for your referral. If you have any questions, please contact me at Dept: [phone].    Sincerely,  Electronically signed by therapist: Tawana Mcmahon, PT     Physician's certification required:  No  Please co-sign or sign and return this letter via fax as soon as possible to [phone].   I certify the need for these services furnished under this plan of treatment and while under my care.    X___________________________________________________ Date____________________    Certification From:  1/24/2025  To:4/24/2025     Date: 12/6/2024  TX#: 4/10 Date: 12/16/2024  TX#: 5/10 Date: 12/27/2024  TX#: 6/10 Date: 1/3/2025  TX#: 7/10 Date: 1/10/2025  TX#: 8/10 Date: 1/17/2025  TX#: 9/10 Date: 1/24/2025  TX#: 10/10  Discharge    Neuro Re-ed: 20'  TA bracing (with education in connection to PFD):  -with breathing coordination in hooklying  Seated PFM lengthening with two folded towels  Dyspareunia- positions  Neuro Re-ed: 6'  MET techniques to correct pelvic obliquity Neuro Re-ed: 15'  Internal PFM re-check:  -coordinating PFM with breathing: PFM lengthening/ relaxation concurrent with PT cueing  -pt encouraged to cont to work on this  Education: void before intercourse to test if any UI is noted with attempt - ensure empty bladder Neuro Re-ed: 14'  Promotion of PFM lengthening: positions: gravity assist, seated with two folded towels  TA bracing with:  -supine march L1->progress to tabletop alt tap, DL tap. Ways to know pt should stop or back off: breathe holding, bracing outwards vs drawing in    Manual Therapy: 12'  Jt mobs: lower thoracic & lumbar central PA gr II-IV  STM: R/L T/L PSM  Lumbar myofascial stretch Manual Therapy: 25'  STM: B glutes/ piriformis (over clothing in R/L sidelying) Manual Therapy: 35'  MFR: R/L external OI (gloved, over clothing), gloved- abdominal cross-hand release, pelvic diaphragm release, diaphragm release R/L Manual Therapy: 24'  Jt mobs: lumbosacral central PA gr II-IV  STM L glutes, piriformis          Self-Care: 10'  Check diet: fiber, water for stool consistency       Therex: 29'  Lumbar AROM assess  Prone hip IR stretch 10 X 10\" - HEP  TAISHA 10 X 10\" - HEP  Prone over 1 pillow: hip ext raises X 15 ea - HEP  S/L hip Abd X 15 ea - HEP  Standing sport cord shld ext 2 X 15 green  Standing sport cord row 2 X 15 blue  Standing sport cord paloff press 2 X 15 ea double blue    Therex: 8'  Standing doorway QL stretch trial  1/2 kneel hip flexor stretch with SB trial  Deep squat  stretch (no wall) trial Therex: 10'  Modified pigeon stretch with back leg bent - trial  -with rotation: post chain stretch  Modified frog stretch - trial Therex: 14'  Discharge planning  Education: Heating pad to abdomen/ pelvis Therex: 24'  Discharge planning  Standing hip Abd & Ext with Blue cuff loop X 20 ea - trial without shoes for increased intrinsic arch control  CW 4 X 20 ft cuff loop  MW fwd-bwd 4 X 20 ft Blue cuff loop  Progression options/ form check:  Rows & shld ext on BOSU flat side down (gray sport cord)  Rows in SLB - gray  [place] DL->SL - gray (double)  Trunk rot with gray sport cord (double): DL->SL  Quadruped alt UE/ LE raise  Fire hydrant  Quadruped multifidi raise Therex: 38'  Discharge instructions  HEP review  -Review stretches: seated piriformis, seated HS, open the book, thread the needle, deep squat, prone hip IR. Breathe through the stretches  -ensure proper abdominal bracing  -pelvic wand in the future? Not needed now  Re-assessment/ goal status completion  Self-MFR piriformis with tennis ball at wall- instruction for HEP with education in proper form. Advise standing vs performing in supine or sitting.   HEP: IE: belly hard belly big with squatty potty/ stepstool; colon massage (ILU massage); avoid sitting > 8 min while having BM  Access Code: [email or web address]  URL: [email or web address]  Date: 11/22/2024  Prepared by: Tawana Mcmahon    Exercises  - Pelvic Floor Lengthening in Hooklying  - 1 x daily - 7 x weekly - 2-3 min duration  - Happy Baby with Pelvic Floor Lengthening  - 1 x daily - 7 x weekly - 2-3 min hold  - Seated Pelvic Floor Lengthening  - 1 x daily - 7 x weekly - 2-3 min duration  - Supine Diaphragmatic Breathing with Pelvic Floor Lengthening  - 1 x daily - 7 x weekly - 2-3 min duration    Access Code: [email or web address]  URL: [email or web address]  Date: 11/27/2024  Prepared by: Tawana Mcmahon    Exercises  - Supine Butterfly Groin Stretch  - 1 x daily - 7 x  weekly - 3 sets - 30 sec hold  - Hooklying Active Hamstring Stretch  - 1 x daily - 7 x weekly - 1 sets - 10 reps - 10 sec hold  - Seated Figure 4 Piriformis Stretch  - 1 x daily - 7 x weekly - 3 sets - 30 sec hold  - Supine Figure 4 Piriformis Stretch  - 1 x daily - 7 x weekly - 3 sets - 30 sec hold  - Cat Cow  - 1 x daily - 7 x weekly - 2 sets - 10 reps - 2-3 sec hold  - Child's Pose Knees Apart and Hands Forward   - 1 x daily - 7 x weekly - 3 sets - 30 sec hold  - Tail Wag  - 1 x daily - 7 x weekly - 2 sets - 10 reps - 2-3 sec hold  - Sidelying Open Book Thoracic Lumbar Rotation and Extension  - 1 x daily - 7 x weekly - 3 sets - 10 reps - 5 sec hold  - Quadruped Full Range Thoracic Rotation with Reach  - 1 x daily - 7 x weekly - 1 sets - 10 reps - 5 sec hold  - Standing 'L' Stretch at Counter  - 1 x daily - 7 x weekly - 3 sets - 30 sec hold  - Kneeling Adductor Stretch with Hip External Rotation  - 1 x daily - 7 x weekly - 3 sets - 30 sec hold  - Deep Squat with Pelvic Floor Relaxation  - 1 x daily - 7 x weekly - 2-3 min duration    Access Code: [email or web address]  URL: [email or web address]  Date: 12/06/2024  Prepared by: Tawana Mcmahon    Exercises  - Static Prone on Elbows  - 1 x daily - 7 x weekly - 1 sets - 10 reps - 10 sec hold  - Prone Hip Extension with Pillow Under Abdomen  - 1 x daily - 4 x weekly - 2 sets - 15 reps  - Sidelying Hip Abduction  - 1 x daily - 4 x weekly - 2 sets - 15 reps  - Prone Hip Internal Rotation AROM  - 1 x daily - 7 x weekly - 1-2 sets - 10 reps - 10 sec hold    Access Code: [email or web address]  URL: [email or web address]  Date: 01/03/2025  Prepared by: Twaana Mcmahon    Exercises  - Quadruped Adductor Stretch  - 1 x daily - 7 x weekly - 3 sets - 30 sec hold  - [place] Pose  - 1 x daily - 7 x weekly - 3 sets - 30 sec hold  - Posterior Chain Stretch  - 1 x daily - 7 x weekly - 3 sets - 30 sec hold    Charges: Therex X 3       Total Timed Treatment: 38 min  Total Treatment  Time: 38 min              21st Century Cures Act Notice to Patient: Medical documents like this are made available to patients in the interest of transparency. However, be advised this is a medical document and it is intended as [id number] communication between your medical providers. This medical document may contain abbreviations, assessments, medical data, and results or other terms that are unfamiliar. Medical documents are intended to carry relevant information, facts as evident, and the clinical opinion of the practitioner. As such, this medical document may be written in language that appears blunt or direct. You are encouraged to contact your medical provider and/or [facility] if you have any questions about this medical document.

## (undated) NOTE — LETTER
Patient Name: Mariama Haines  YOB: 1980          MRN number:  [id number]  Date:  11/15/2024  Referring Physician:  Physician Nonstaff         MUSCULOSKELETAL AND PELVIC FLOOR EVALUATION:     Diagnosis:   High-tone pelvic floor dysfunction in female (M62.89)      Referring Provider: Physician Nonstaff  Date of Evaluation:    11/15/2024    Precautions:  None Next MD visit:   none scheduled  Date of Surgery: n/a     PATIENT SUMMARY   Mariama Haines is a 44 year old female who presents to therapy today with complaints of dyspareunia, rectal discomfort & pain primarily with sitting, increased duration to empty bowels with defecation; abdominal fullness/ heaviness with hx anal fissures & both internal/ external hemorrhoids. First colonoscopy/ endoscopy  WNL, second in  WNL. Some chronic bleeding. Finally saw the correct provider to try to get to the route of her issue.    Pt describes pain level: with intercourse worst 8-9/10; with sitting 8/10. Low back tightness/ soreness more recently, L knee swollen/ achy (no EDGAR). No pelvic/ abdominal pain.    Pregnant Now: No  Obstetrical/Gynecological history: ; light menses OCP (no significant pain)    Urodynamic Test: no  Manometry: no    Occupation/Activities:  (sitting)    PFDI-20: 84.38/300; Impairment= 28.126 %  PFDI 20    Scores   POPDI 6:    29.17   CRAD 8:    34.38   LOUANN 6:    20.83   Summary:    84.38      Mariama describes prior level of function: symptoms ongoing for many years. Pt goals include \"to get rid of the discomfort & to have any bleeding stop, have intercourse more enjoyable.\"  Past medical history was reviewed with Mariama. Significant findings include  has a past medical history of Abdominal pain (2021), Anemia ( / 2021), Bloating (2021), Blood in the stool (2021), Body piercing (20ish years ago), Change in hair (2021), Decorative tattoo (20ish years ago),  Depression, Diarrhea, unspecified (Occasional over the years), Easy bruising (Years), Excessive bleeding (08-Jan-2021), Fatigue (Jan 2021), Flatulence/gas pain/belching (Jan 2021), Frequent UTI (Have a history but none for years), Headache disorder (Jan 2021), Hemorrhoids (Years ago), Leg swelling (Occasionally for years), and Rash (2 years ago).    URINARY HABITS  Types of symptoms: other: none    Abdominal/Vaginal Pressure complaints: no - fullness/ heaviness to abdomen; no bulge  Urinary Frequency: feels WNL  Leaking occurs: no  Nocturia: no  Fluid Intake: aims for 64 oz  Hovering: only in public    BOWEL HABITS  Types of symptoms: Constipation; needs to sit a while to empty. Can sit for 20 min  Frequency of bowel movements: multiple times/ day: usually 2x  Stool consistency: [place] Stool Scale: 4-5  Do you strain with defecation: Yes - occasional  Laxative use: No  Pescatarian    SEXUAL HEALTH STATUS  Marinoff Scale: 2  Sexual [place] Status: active  Pain with initial and/or deep penetration: deeper, positional (not sure if having a few episodes of leakage)  Pain with pelvic exam/tampon use: no/no    ASSESSMENT  Mariama presents to physical therapy evaluation with primary c/o dyspareunia, rectal discomfort & pain primarily with sitting, increased duration to empty bowels with defecation; abdominal fullness/ heaviness with hx anal fissures & both internal/ external hemorrhoids. The results of the objective tests and measures show external impairments in posture, soft tissue mobility/ abdominal wall integrity, hip ROM, hip strength, LE flexibility, breathing mechanics, abdominal bracing with additional objective measures including internal PFM exam to be completed at subsequent session with pt's ongoing informed consent. Functional deficits include but are not limited to constipation with increased duration required for bowel emptying (up to 20 min); occasional straining; pain with deeper penetration with  intercourse including positional (Marinoff 2); pain/ discomfort with sitting. Signs and symptoms are consistent with diagnosis of High-tone pelvic floor dysfunction in female (M62.89). Pt and PT discussed evaluation findings, pathology, POC and HEP. Pt voiced understanding and performs HEP correctly without reported pain. Skilled Pelvic Physical Therapy is medically necessary to address the above impairments and reach functional goals.    OBJECTIVE:   Consent obtained with all palpation listed below.    Posture: mild reduced lumbar lordotic curve; mild B rib flare    External Palpation: pain-free STRs to B adductors, B glutes/ piriformis; B external OI (prone)    Abdominal Wall Integrity: increased tension throughout with mod reduced fascial glide all planes    Range Of Motion:  Lumbar AROM screen: defer  LE PROM screen: ER: R mod restricted, L min restricted; IR: R min restricted, L mod restricted    Strength (MMT):  Hip Abduction: R 4/5, L 4+/5  Hip Extension: R 4/5, L 4/5    Flexibility Summary: HS L min-mod restricted, R min restricted; piriformis B min restricted    Breathing Mechanics: slightly more shallow breathing pattern with overall reduced 360 expansion all planes    Abdominal Bracing: doming with upper ab gripping    Informed consent for internal pelvic evaluation given: Yes, deferred to subsequent visit.    Today's Treatment and Response:   Patient education was provided on objective findings of external evaluation and expectations with treatment outcomes. Educated on pelvic anatomy and function with diagrams and pelvic model and proper toileting posture. Advised to avoid sitting > 8 min while having BM to avoid strain/ stress to delicate rectal tissue (to avoid additional stress on fissures/ hemorrhoids)    Patient was instructed in and issued a HEP for: belly hard belly big with squatty potty/ stepstool; colon massage (ILU massage)    Charges: PT Eval Moderate Complexity, Neuro X 1      Total Timed  Treatment: 12 min     Total Treatment Time: 40 min     Based on clinical rationale and outcome measures, this evaluation involved Moderate Complexity decision making due to 1-2 personal factors/comorbidities, 4+ body structures involved/activity limitations, and evolving symptoms including  constipation, dyspareunia  PLAN OF CARE:    Goals: (to be met in 10 visits)  Patient will report consumption of at least 25 grams of daily fiber and 64 ounces of water for improved stool consistency.  Patient will demonstrate optimal voiding mechanics and breath regulation with bowel movements for improved bowel emptying without straining, for reduced risk of POP, with avoiding sitting > 8' without active BM.  Patient will report Marinoff Scale 0-1 & will demonstrate PFM lengthening WNL with coordinated diaphragmatic breathing x 10 reps to alleviate pain and muscle tension during intercourse.  Patient will report pain at worst 4/10 with prolonged sitting during work day.  Patient will demonstrate B HS & piriformis muscle length WNL to alleviate tension in support structures of pelvic floor musculature.  Patient will report adherence to HEP for continued exercise benefits following cessation of PT.    Frequency / Duration: Patient will be seen for 1-2 x/week or a total of 10 visits over a 90 day period.  Treatment will include: Manual Therapy, Neuromuscular Re-education, Self-Care Home Management, Therapeutic Activities, Therapeutic Exercise, Home Exercise Program instruction, and Modalities to include: Electrical stimulation (unattended) and Ultrasound     Education or treatment limitation: None  Rehab Potential:excellent    Patient/Family/Caregiver was advised of these findings, precautions, and treatment options and has agreed to actively participate in planning and for this course of care.    Thank you for your referral. Please co-sign or sign and return this letter via fax as soon as possible to [phone]. If you have any  questions, please contact me at Dept: [phone]    Sincerely,  Electronically signed by therapist: Tawana Mcmahon PT    Physician's certification required: Yes  I certify the need for these services furnished under this plan of treatment and while under my care.    X___________________________________________________ Date____________________    Certification From: 11/15/2024  To:2/13/2025 21st Century Cures Act Notice to Patient: Medical documents like this are made available to patients in the interest of transparency. However, be advised this is a medical document and it is intended as [id number] communication between your medical providers. This medical document may contain abbreviations, assessments, medical data, and results or other terms that are unfamiliar. Medical documents are intended to carry relevant information, facts as evident, and the clinical opinion of the practitioner. As such, this medical document may be written in language that appears blunt or direct. You are encouraged to contact your medical provider and/or [facility] Patient Experience if you have any questions about this medical document.

## (undated) NOTE — Clinical Note
Are there counselors who deal with female sexual dysfunction?  Otherwise I may need to contact PT for pelvic floor

## (undated) NOTE — Clinical Note
I had the pleasure of seeing Rosalie Keller on 4/23/2021. Please see my attached note.     Jana Rucker MD FACS  [facility]

## (undated) NOTE — LETTER
07/23/18        [address] [id number]      Dear Crystal Strauss,    [address] [facility] records indicate that you have outstanding lab work and or testing that was ordered for you and has not yet been completed:          TSH W Reflex To

## (undated) NOTE — Clinical Note
I had the pleasure of seeing Andrews Mccormick on 6/23/2021. Please see my attached note.     Brandi Martino MD FACS  [facility]

## (undated) NOTE — Clinical Note
I had the pleasure of seeing Yvonne Flatter on 5/24/2021. Please see my attached note.     Anamaria Willams MD FACS  [facility]